# Patient Record
Sex: MALE | Race: WHITE | NOT HISPANIC OR LATINO | Employment: OTHER | URBAN - METROPOLITAN AREA
[De-identification: names, ages, dates, MRNs, and addresses within clinical notes are randomized per-mention and may not be internally consistent; named-entity substitution may affect disease eponyms.]

---

## 2018-02-27 ENCOUNTER — OFFICE VISIT CONVERTED (OUTPATIENT)
Dept: CARDIOLOGY | Facility: CLINIC | Age: 73
End: 2018-02-27
Attending: SPECIALIST

## 2018-04-12 ENCOUNTER — OFFICE VISIT CONVERTED (OUTPATIENT)
Dept: ORTHOPEDIC SURGERY | Facility: CLINIC | Age: 73
End: 2018-04-12
Attending: PHYSICIAN ASSISTANT

## 2018-05-03 ENCOUNTER — OFFICE VISIT CONVERTED (OUTPATIENT)
Dept: ORTHOPEDIC SURGERY | Facility: CLINIC | Age: 73
End: 2018-05-03
Attending: ORTHOPAEDIC SURGERY

## 2018-05-14 ENCOUNTER — CONVERSION ENCOUNTER (OUTPATIENT)
Dept: ORTHOPEDIC SURGERY | Facility: CLINIC | Age: 73
End: 2018-05-14

## 2018-05-14 ENCOUNTER — OFFICE VISIT CONVERTED (OUTPATIENT)
Dept: ORTHOPEDIC SURGERY | Facility: CLINIC | Age: 73
End: 2018-05-14
Attending: PHYSICIAN ASSISTANT

## 2018-06-05 ENCOUNTER — OFFICE VISIT CONVERTED (OUTPATIENT)
Dept: ORTHOPEDIC SURGERY | Facility: CLINIC | Age: 73
End: 2018-06-05
Attending: PHYSICIAN ASSISTANT

## 2018-07-10 ENCOUNTER — OFFICE VISIT CONVERTED (OUTPATIENT)
Dept: ORTHOPEDIC SURGERY | Facility: CLINIC | Age: 73
End: 2018-07-10
Attending: PHYSICIAN ASSISTANT

## 2018-08-28 ENCOUNTER — OFFICE VISIT CONVERTED (OUTPATIENT)
Dept: CARDIOLOGY | Facility: CLINIC | Age: 73
End: 2018-08-28
Attending: SPECIALIST

## 2019-06-24 ENCOUNTER — OFFICE VISIT CONVERTED (OUTPATIENT)
Dept: CARDIOLOGY | Facility: CLINIC | Age: 74
End: 2019-06-24
Attending: SPECIALIST

## 2019-08-20 ENCOUNTER — CONVERSION ENCOUNTER (OUTPATIENT)
Dept: CARDIOLOGY | Facility: CLINIC | Age: 74
End: 2019-08-20
Attending: SPECIALIST

## 2019-12-17 ENCOUNTER — OFFICE VISIT CONVERTED (OUTPATIENT)
Dept: CARDIOLOGY | Facility: CLINIC | Age: 74
End: 2019-12-17
Attending: SPECIALIST

## 2020-06-19 ENCOUNTER — OFFICE VISIT CONVERTED (OUTPATIENT)
Dept: CARDIOLOGY | Facility: CLINIC | Age: 75
End: 2020-06-19
Attending: SPECIALIST

## 2021-01-05 ENCOUNTER — OFFICE VISIT CONVERTED (OUTPATIENT)
Dept: CARDIOLOGY | Facility: CLINIC | Age: 76
End: 2021-01-05
Attending: SPECIALIST

## 2021-01-05 ENCOUNTER — CONVERSION ENCOUNTER (OUTPATIENT)
Dept: OTHER | Facility: HOSPITAL | Age: 76
End: 2021-01-05

## 2021-05-13 NOTE — PROGRESS NOTES
"   Progress Note      Patient Name: Michael Garcia   Patient ID: [id number]   Sex: Male   YOB: 1945    Primary Care Provider: Casey Adams MD   Referring Provider: Mr. Terrence Dyer PA-C    Visit Date: June 19, 2020    Provider: Casey Adams MD   Location: [facility]   Location Address: [address] [place] [address], [facility]   [place], [place]  [id number]   Location Phone: [phone]          Chief Complaint     Coronary artery disease.  Hyperlipidemia.       History Of Present Illness  Michael Garcia is a 74 year old /White male with a history of coronary artery disease status post coronary artery bypass graft surgery. Denies any chest pain or shortness of breath.   CURRENT MEDICATIONS: Aspirin 81 mg daily; Crestor 20 mg daily. Dosage and frequency of the medication(s) reviewed with the patient.   PAST MEDICAL HISTORY: Atrial fibrillation status post ablation; Coronary artery disease status post CABG; Hyperlipidemia.   FAMILY HISTORY: Negative for diabetes mellitus, hypertension, or heart disease.   PSYCHOSOCIAL HISTORY: Denies alcohol or tobacco use. Walks 2-4 miles daily and 30 minute workout daily. Denies mood changes or depression.       Review of Systems  · Cardiovascular  o Denies  o : palpitations (fast, fluttering, or skipping beats), swelling (feet, ankles, hands), shortness of breath while walking or lying flat, chest pain or angina pectoris   · Respiratory  o Denies  o : chronic or frequent cough, asthma or wheezing      Vitals  Date Time BP Position Site L\R Cuff Size HR RR TEMP (F) WT  HT  BMI kg/m2 BSA m2 O2 Sat        06/19/2020 11:21 /60 Sitting    62 - R   180lbs 0oz 5'  6\" 29.05 1.95           Physical Examination  · Constitutional  o Appearance  o : Awake, alert, cooperative, pleasant.  · Respiratory  o Inspection of Chest  o : No chest wall deformities, moving equal.  o Auscultation of Lungs  o : Good air entry with vesicular breath " sounds.  · Cardiovascular  o Heart  o :   § Auscultation of Heart  § : S1 and S2 regular. No S3. No S4. No murmurs.  o Peripheral Vascular System  o :   § Extremities  § : Peripheral pulses were well felt. No edema. No cyanosis.  · Gastrointestinal  o Abdominal Examination  o : No masses or organomegaly noted.  · EKG  o EKG  o : Performed in the office today.  o Indications  o : Atrial fibrillation.   o Results  o : Sinus rhythm. Inferior wall MI, age-indeterminate.           Assessment     ASSESSMENT & PLAN:    1.  Coronary artery disease with history of coronary artery bypass graft surgery, stable.  Continue aspirin.  2.  Atrial fibrillation status post ablation, stable.  3.  Hyperlipidemia.  Continue Crestor.    4.  See me back in 6 months.             Electronically Signed by: Dimple Rizzo-, Other -Author on June 22, 2020 10:04:05 AM  Electronically Co-signed by: Casey Adams MD -Reviewer on July 13, 2020 08:59:23 AM

## 2021-05-14 VITALS
WEIGHT: 183 LBS | BODY MASS INDEX: 29.41 KG/M2 | DIASTOLIC BLOOD PRESSURE: 70 MMHG | SYSTOLIC BLOOD PRESSURE: 122 MMHG | HEIGHT: 66 IN | HEART RATE: 72 BPM

## 2021-05-14 NOTE — PROGRESS NOTES
"   Progress Note      Patient Name: Michael Garcia   Patient ID: [id number]   Sex: Male   YOB: 1945    Primary Care Provider: Casey Adams MD   Referring Provider: Mr. Terrence Dyer PA-C    Visit Date: January 5, 2021    Provider: Casey Adams MD   Location: [facility]   Location Address: [address] [place] [address], [address]   [place], [place]  [id number]   Location Phone: [phone]          Chief Complaint     Coronary artery disease.   Hyperlipidemia.  History of atrial fibrillation.       History Of Present Illness  Michael Garcia is a 75 year old /White male with history of coronary artery disease status post coronary artery bypass graft surgery and history of atrial fibrillation status post ablation. No chest pain or shortness of breath.   CURRENT MEDICATIONS: Crestor 20 mg daily; aspirin 81 mg daily.   PAST MEDICAL HISTORY: Atrial fibrillation status post ablation; Coronary artery disease status post CABG; Hyperlipidemia.   PSYCHOSOCIAL HISTORY: Denies use of alcohol. Denies tobacco use.      ALLERGIES:  No known drug allergies.       Review of Systems  · Cardiovascular  o Denies  o : palpitations (fast, fluttering, or skipping beats), swelling (feet, ankles, hands), shortness of breath while walking or lying flat, chest pain or angina pectoris   · Respiratory  o Denies  o : chronic or frequent cough      Vitals  Date Time BP Position Site L\R Cuff Size HR RR TEMP (F) WT  HT  BMI kg/m2 BSA m2 O2 Sat FR L/min FiO2 HC       01/05/2021 02:01 /70 Sitting    72 - R   183lbs 0oz 5'  6\" 29.54 1.97             Physical Examination  · Constitutional  o Appearance  o : Awake, alert, cooperative, pleasant.  · Respiratory  o Inspection of Chest  o : No chest wall deformities, moving equal.  o Auscultation of Lungs  o : Good air entry with vesicular breath sounds.  · Cardiovascular  o Heart  o :   § Auscultation of Heart  § : S1 and S2 regular. No S3. No S4. No murmurs.  o Peripheral " Vascular System  o :   § Extremities  § : Peripheral pulses were well felt. No edema. No cyanosis.  · Gastrointestinal  o Abdominal Examination  o : No masses or organomegaly noted.          Assessment     ASSESSMENT AND PLAN:  1.  Coronary artery disease with history of coronary artery bypass graft surgery, stable.  Continue aspirin.   2.  Atrial fibrillation, status post ablation, stable.  He has been in sinus rhythm for several years.   3.  Hyperlipidemia.  Continue Crestor.  Check lipids and hepatic profile on next visit.   4.  See me back in 6 months.                Electronically Signed by: Alyssa Kraus-, Other -Author on January 5, 2021 04:31:52 PM  Electronically Co-signed by: Casey Adams MD -Reviewer on January 11, 2021 12:21:37 PM

## 2021-05-15 VITALS
SYSTOLIC BLOOD PRESSURE: 144 MMHG | HEART RATE: 58 BPM | BODY MASS INDEX: 29.25 KG/M2 | HEIGHT: 66 IN | DIASTOLIC BLOOD PRESSURE: 86 MMHG | WEIGHT: 182 LBS

## 2021-05-15 VITALS
HEART RATE: 62 BPM | SYSTOLIC BLOOD PRESSURE: 126 MMHG | HEIGHT: 66 IN | DIASTOLIC BLOOD PRESSURE: 60 MMHG | WEIGHT: 180 LBS | BODY MASS INDEX: 28.93 KG/M2

## 2021-05-15 VITALS
HEART RATE: 62 BPM | WEIGHT: 175 LBS | HEIGHT: 66 IN | DIASTOLIC BLOOD PRESSURE: 72 MMHG | BODY MASS INDEX: 28.12 KG/M2 | SYSTOLIC BLOOD PRESSURE: 134 MMHG

## 2021-05-16 VITALS
DIASTOLIC BLOOD PRESSURE: 80 MMHG | WEIGHT: 176 LBS | HEIGHT: 66 IN | BODY MASS INDEX: 28.28 KG/M2 | HEART RATE: 52 BPM | SYSTOLIC BLOOD PRESSURE: 136 MMHG

## 2021-05-16 VITALS — HEIGHT: 66 IN | RESPIRATION RATE: 18 BRPM | WEIGHT: 185 LBS | BODY MASS INDEX: 29.73 KG/M2

## 2021-05-16 VITALS — WEIGHT: 185 LBS | RESPIRATION RATE: 16 BRPM | BODY MASS INDEX: 29.73 KG/M2 | HEIGHT: 66 IN

## 2021-05-16 VITALS — RESPIRATION RATE: 16 BRPM | HEIGHT: 66 IN | WEIGHT: 185 LBS | BODY MASS INDEX: 29.73 KG/M2

## 2021-05-16 VITALS — WEIGHT: 185 LBS | BODY MASS INDEX: 29.73 KG/M2 | RESPIRATION RATE: 16 BRPM | HEIGHT: 66 IN

## 2021-05-16 VITALS
SYSTOLIC BLOOD PRESSURE: 124 MMHG | HEIGHT: 66 IN | DIASTOLIC BLOOD PRESSURE: 74 MMHG | HEART RATE: 70 BPM | WEIGHT: 185 LBS | BODY MASS INDEX: 29.73 KG/M2

## 2021-05-16 VITALS — BODY MASS INDEX: 29.73 KG/M2 | HEIGHT: 66 IN | RESPIRATION RATE: 16 BRPM | WEIGHT: 185 LBS

## 2021-07-01 RX ORDER — ROSUVASTATIN CALCIUM 20 MG/1
20 TABLET, COATED ORAL DAILY
COMMUNITY
End: 2022-08-30 | Stop reason: SDUPTHER

## 2021-07-01 RX ORDER — HYDROCODONE BITARTRATE AND ACETAMINOPHEN 7.5; 325 MG/1; MG/1
1-2 TABLET ORAL EVERY 6 HOURS PRN
COMMUNITY
End: 2022-02-15

## 2021-07-01 RX ORDER — DICLOFENAC SODIUM AND MISOPROSTOL 75; 200 MG/1; UG/1
1 TABLET, DELAYED RELEASE ORAL 2 TIMES DAILY
COMMUNITY
End: 2022-02-15

## 2021-07-01 RX ORDER — ASPIRIN 81 MG/1
81 TABLET ORAL DAILY
COMMUNITY

## 2021-07-08 ENCOUNTER — OFFICE VISIT (OUTPATIENT)
Dept: CARDIOLOGY | Facility: CLINIC | Age: 76
End: 2021-07-08

## 2021-07-08 VITALS
DIASTOLIC BLOOD PRESSURE: 80 MMHG | HEART RATE: 64 BPM | BODY MASS INDEX: 29.73 KG/M2 | SYSTOLIC BLOOD PRESSURE: 152 MMHG | HEIGHT: 66 IN | WEIGHT: 185 LBS

## 2021-07-08 DIAGNOSIS — Z95.1 HX OF CABG: ICD-10-CM

## 2021-07-08 DIAGNOSIS — R00.2 PALPITATIONS: Primary | ICD-10-CM

## 2021-07-08 DIAGNOSIS — I25.10 CORONARY ARTERY DISEASE INVOLVING NATIVE CORONARY ARTERY OF NATIVE HEART WITHOUT ANGINA PECTORIS: ICD-10-CM

## 2021-07-08 PROCEDURE — 93000 ELECTROCARDIOGRAM COMPLETE: CPT | Performed by: SPECIALIST

## 2021-07-08 PROCEDURE — 99213 OFFICE O/P EST LOW 20 MIN: CPT | Performed by: SPECIALIST

## 2021-07-08 NOTE — PROGRESS NOTES
[facility]  Cardiology progress Note    Patient Name: Michael Garcia  : 1945    CC: Coronary artery disease, palpitations    Subjective   Subjective       HPI:  Michael Garcia is a 75 y.o. male with history of palpitations and coronary disease.  No further palpitations no chest pain or shortness of breath.    Review of Systems:   Constitutional no fever,  no weight loss   Skin no rash   Otolaryngeal no difficulty swallowing   Cardiovascular See HPI   Pulmonary no cough, no sputum production   Gastrointestinal no constipation, no diarrhea   Genitourinary no dysuria, no hematuria   Hematologic no easy bruisability, no abnormal bleeding   Musculoskeletal no muscle pain   Neurologic no dizziness, no falls         Personal History     Social History:  reports that he quit smoking about 51 years ago. He has never used smokeless tobacco. He reports previous alcohol use. He reports that he does not use drugs.    Home Medications:  Current Outpatient Medications on File Prior to Visit   Medication Sig   • aspirin (aspirin) 81 MG EC tablet Take 81 mg by mouth Daily.   • rosuvastatin (CRESTOR) 20 MG tablet Take 20 mg by mouth Daily.   • diclofenac-miSOPROStol (ARTHROTEC 75) 75-0.2 MG EC tablet Take 1 tablet by mouth 2 (Two) Times a Day.   • HYDROcodone-acetaminophen (NORCO) 7.5-325 MG per tablet Take 1-2 tablets by mouth Every 6 (Six) Hours As Needed.     No current facility-administered medications on file prior to visit.     Allergies:  No Known Allergies    Objective    Objective       Vitals:   Heart Rate:  [64] 64  BP: (152)/(80) 152/80  Body mass index is 29.86 kg/m².     Physical Exam:   Constitutional: Awake, alert, No acute distress    Eyes: PERRLA, sclerae anicteric, no conjunctival injection   HENT: NCAT, mucous membranes moist   Neck: Supple, no thyromegaly, no lymphadenopathy, trachea midline   Respiratory: Clear to auscultation bilaterally, nonlabored respirations    Cardiovascular: RRR,  no murmurs, rubs, or gallops, palpable pedal pulses bilaterally   Gastrointestinal: Positive bowel sounds, soft, nontender, nondistended   Musculoskeletal: No bilateral ankle edema, no clubbing or cyanosis to extremities   Psychiatric: Appropriate affect, cooperative   Neurologic: Oriented x 3, strength symmetric in all extremities, Cranial Nerves grossly intact to confrontation, speech clear   Skin: No rashes.    Result Review    Result Review:  I have personally reviewed the available results from  [x]  Laboratory  [x]  EKG  [x]  Cardiology  [x]  Medications  [x]  Old records  []  Other:     ECG 12 Lead    Date/Time: 7/8/2021 11:46 AM  Performed by: Casey Adams MD  Authorized by: Casey Adams MD   Comparison: compared with previous ECG   Similar to previous ECG  Rhythm: sinus rhythm    Clinical impression: normal ECG  Comments: Normal sinus rhythm.  No significant changes compared to previous EKG            Impression/Plan:  1 coronary disease with history of coronary artery bypass graft stable: Continue aspirin.  2. Palpitations stable: No further palpitations.  EKG shows sinus rhythm  3.  Hyperlipidemia: Continue Crestor.  Check lipid profile next visit.             Electronically signed by Casey Adams MD, 07/08/21, 11:27 AM EDT.

## 2022-02-13 NOTE — PROGRESS NOTES
[facility]  Cardiology progress Note    Patient Name: Michael Garcia  : 1945    CHIEF COMPLAINT  CORONARY ARTERY DISEASE, history of CABG.      Subjective   Subjective     HISTORY OF PRESENT ILLNESS    Michael Garcia is a 76 y.o. male with history of coronary disease and CABG.  No chest pain or shortness of breath.    Review of Systems:   Constitutional no fever,  no weight loss   Skin no rash   Otolaryngeal no difficulty swallowing   Cardiovascular See HPI   Pulmonary no cough, no sputum production   Gastrointestinal no constipation, no diarrhea   Genitourinary no dysuria, no hematuria   Hematologic no easy bruisability, no abnormal bleeding   Musculoskeletal no muscle pain   Neurologic no dizziness, no falls         Personal History     Social History:  reports that he quit smoking about 52 years ago. He has never used smokeless tobacco. He reports previous alcohol use. He reports that he does not use drugs.    Home Medications:  Current Outpatient Medications on File Prior to Visit   Medication Sig   • aspirin (aspirin) 81 MG EC tablet Take 81 mg by mouth Daily.   • rosuvastatin (CRESTOR) 20 MG tablet Take 20 mg by mouth Daily.   • [DISCONTINUED] diclofenac-miSOPROStol (ARTHROTEC 75) 75-0.2 MG EC tablet Take 1 tablet by mouth 2 (Two) Times a Day.   • [DISCONTINUED] HYDROcodone-acetaminophen (NORCO) 7.5-325 MG per tablet Take 1-2 tablets by mouth Every 6 (Six) Hours As Needed.     No current facility-administered medications on file prior to visit.     Allergies:  No Known Allergies    Objective    Objective       Vitals:   Heart Rate:  [65] 65  BP: (131)/(69) 131/69  Body mass index is 29.7 kg/m².     Physical Exam:   Constitutional: Awake, alert, No acute distress    Eyes: PERRLA, sclerae anicteric, no conjunctival injection   HENT: NCAT, mucous membranes moist   Neck: Supple, no thyromegaly, no lymphadenopathy, trachea midline   Respiratory: Clear to auscultation bilaterally, nonlabored  respirations    Cardiovascular: RRR, no murmurs or rubs. Palpable pedal pulses bilaterally   Musculoskeletal: No bilateral ankle edema, no cyanosis to extremities   Psychiatric: Appropriate affect, cooperative   Neurologic: Oriented x 3, strength symmetric in all extremities, Cranial Nerves grossly intact to confrontation, speech clear   Skin: No rashes.    Result Review    Result Review:  I have personally reviewed the available results from  [x]  Laboratory  [x]  EKG  [x]  Cardiology  [x]  Medications  [x]  Old records  []  Other:     ECG 12 Lead    Date/Time: 2/15/2022 11:46 AM  Performed by: Casey Adams MD  Authorized by: Casey Adams MD   Comparison: compared with previous ECG   Similar to previous ECG  Rhythm: sinus rhythm  Rate: normal  Conduction: non-specific intraventricular conduction delay  QRS axis: normal    Clinical impression: abnormal EKG  Comments: Normal sinus rhythm.  Inferior wall MI age-indeterminate.  Nonspecific ST changes.  No change from previous EKG             Impression/Plan:  1.  Coronary disease s/p CABG: Continue aspirin 81 mg a day.  No further chest pain.  2.  History of atrial fibrillation status post ablation stable: Sinus rhythm for several years.  No palpitations.  3.  Hyperlipidemia: Continue Crestor 20 mg a day.  Check lipid and hepatic profile.           Casey Adams MD   02/15/22   11:41 EST

## 2022-02-15 ENCOUNTER — OFFICE VISIT (OUTPATIENT)
Dept: CARDIOLOGY | Facility: CLINIC | Age: 77
End: 2022-02-15

## 2022-02-15 VITALS
BODY MASS INDEX: 29.57 KG/M2 | HEART RATE: 65 BPM | SYSTOLIC BLOOD PRESSURE: 131 MMHG | WEIGHT: 184 LBS | HEIGHT: 66 IN | DIASTOLIC BLOOD PRESSURE: 69 MMHG

## 2022-02-15 DIAGNOSIS — E78.2 HYPERLIPEMIA, MIXED: ICD-10-CM

## 2022-02-15 DIAGNOSIS — Z95.5 HISTORY OF CORONARY ANGIOPLASTY WITH INSERTION OF STENT: ICD-10-CM

## 2022-02-15 DIAGNOSIS — I25.10 CORONARY ARTERY DISEASE INVOLVING NATIVE CORONARY ARTERY OF NATIVE HEART WITHOUT ANGINA PECTORIS: Primary | ICD-10-CM

## 2022-02-15 PROCEDURE — 93000 ELECTROCARDIOGRAM COMPLETE: CPT | Performed by: SPECIALIST

## 2022-02-15 PROCEDURE — 99214 OFFICE O/P EST MOD 30 MIN: CPT | Performed by: SPECIALIST

## 2022-03-01 ENCOUNTER — LAB (OUTPATIENT)
Dept: LAB | Facility: HOSPITAL | Age: 77
End: 2022-03-01

## 2022-03-01 DIAGNOSIS — E78.2 HYPERLIPEMIA, MIXED: ICD-10-CM

## 2022-03-01 LAB
ALBUMIN SERPL-MCNC: 4.2 G/DL (ref 3.5–5.2)
ALP SERPL-CCNC: 36 U/L (ref 39–117)
ALT SERPL W P-5'-P-CCNC: 16 U/L (ref 1–41)
AST SERPL-CCNC: 17 U/L (ref 1–40)
BILIRUB CONJ SERPL-MCNC: 0.2 MG/DL (ref 0–0.3)
BILIRUB INDIRECT SERPL-MCNC: 0.4 MG/DL
BILIRUB SERPL-MCNC: 0.6 MG/DL (ref 0–1.2)
CHOLEST SERPL-MCNC: 143 MG/DL (ref 0–200)
HDLC SERPL-MCNC: 44 MG/DL (ref 40–60)
LDLC SERPL CALC-MCNC: 85 MG/DL (ref 0–100)
LDLC/HDLC SERPL: 1.95 {RATIO}
PROT SERPL-MCNC: 7.6 G/DL (ref 6–8.5)
TRIGL SERPL-MCNC: 67 MG/DL (ref 0–150)
VLDLC SERPL-MCNC: 14 MG/DL (ref 5–40)

## 2022-03-01 PROCEDURE — 80061 LIPID PANEL: CPT

## 2022-03-01 PROCEDURE — 80076 HEPATIC FUNCTION PANEL: CPT

## 2022-03-01 PROCEDURE — 36415 COLL VENOUS BLD VENIPUNCTURE: CPT

## 2022-03-03 ENCOUNTER — OFFICE VISIT (OUTPATIENT)
Dept: CARDIOLOGY | Facility: CLINIC | Age: 77
End: 2022-03-03

## 2022-03-03 VITALS
BODY MASS INDEX: 29.09 KG/M2 | DIASTOLIC BLOOD PRESSURE: 78 MMHG | WEIGHT: 181 LBS | HEART RATE: 59 BPM | HEIGHT: 66 IN | SYSTOLIC BLOOD PRESSURE: 134 MMHG

## 2022-03-03 DIAGNOSIS — R07.89 ATYPICAL CHEST PAIN: Primary | ICD-10-CM

## 2022-03-03 PROBLEM — S83.212A BUCKET-HANDLE TEAR OF MEDIAL MENISCUS OF LEFT KNEE AS CURRENT INJURY: Status: ACTIVE | Noted: 2018-05-03

## 2022-03-03 PROCEDURE — 99212 OFFICE O/P EST SF 10 MIN: CPT | Performed by: NURSE PRACTITIONER

## 2022-03-03 NOTE — PROGRESS NOTES
"Chief Complaint  Follow-up and Numbness (back of right arm )    Subjective     {Problem List  Visit Diagnosis   Encounters  Notes  Medications  Labs  Result Review Imaging  Media :23}       History of Present Illness  Michael Garcia is a 76-year-old white/ male patient who presents to the office today with complaints of right shoulder pain that began approximately 2 weeks ago.  He reports that the pain is radiating toward his back, right forearm, and has associated numbness sensation that began 2 days after the initial pain began.  He also has complaint of forgetfulness that has been worsening over the last few months.  His wife is present in the office with him today and confirms this.  The wife states that \"he will not go to a family doctor, this is the only doctor I can get him to come to\".  He denies any chest pain, shortness of breath, lightheadedness/dizziness, palpitations, or edema.  He reports compliance with taking his aspirin and Crestor daily.    PMH  Past Medical History:   Diagnosis Date   • Atrial fibrillation    • Bucket-handle tear of medial meniscus of left knee as current injury 05/15/2018    Subsequent encounter   • Coronary artery disease    • Hyperlipidemia    • Osteoarthritis of AC (acromioclavicular) joint 06/11/2015   • Palpitations    • Peripheral tear of medial meniscus of left knee as current injury 05/03/2018    Subsequent encouter   • Rotator cuff tear 03/19/2015   • SLAP tear of shoulder 03/19/2015         ALLERGY  No Known Allergies       SURGICALHX  Past Surgical History:   Procedure Laterality Date   • CARDIAC ABLATION     • CARDIAC CATHETERIZATION     • CARDIAC SURGERY      Heart Bypass   • CORONARY ARTERY BYPASS GRAFT     • KNEE ARTHROSCOPY W/ PARTIAL MEDIAL MENISCECTOMY      Meniscectomy, knee, medial   • SHOULDER SURGERY      Arthroscopic Shoulder Surgery          SOC  Social History     Socioeconomic History   • Marital status:    Tobacco Use   • " "Smoking status: Former Smoker     Quit date: 1970     Years since quittin.2   • Smokeless tobacco: Never Used   Vaping Use   • Vaping Use: Never used   Substance and Sexual Activity   • Alcohol use: Not Currently   • Drug use: Never   • Sexual activity: Defer         FAMHX  Family History   Problem Relation Age of Onset   • Diabetes Mother    • No Known Problems Father           MEDSIGONLY  Current Outpatient Medications on File Prior to Visit   Medication Sig   • aspirin (aspirin) 81 MG EC tablet Take 81 mg by mouth Daily.   • rosuvastatin (CRESTOR) 20 MG tablet Take 20 mg by mouth Daily.     No current facility-administered medications on file prior to visit.         Objective   /78   Pulse 59   Ht 167.6 cm (66\")   Wt 82.1 kg (181 lb)   BMI 29.21 kg/m²       Physical Exam  HENT:      Head: Normocephalic.   Neck:      Vascular: No carotid bruit.   Cardiovascular:      Rate and Rhythm: Normal rate and regular rhythm.      Pulses: Normal pulses.      Heart sounds: Normal heart sounds. No murmur heard.      Pulmonary:      Effort: Pulmonary effort is normal.      Breath sounds: Normal breath sounds.   Musculoskeletal:      Cervical back: Neck supple.      Right lower leg: No edema.      Left lower leg: No edema.   Skin:     General: Skin is dry.   Neurological:      Mental Status: He is alert and oriented to person, place, and time.   Psychiatric:         Behavior: Behavior normal.     Patient refused to have EKG performed      Result Review :   The following data was reviewed by: DARA Alfonso on 2022:  No results found for: PROBNP  CMP    CMP 3/1/22   Albumin 4.20   Total Bilirubin 0.6   Alkaline Phosphatase 36 (A)   AST (SGOT) 17   ALT (SGPT) 16   (A) Abnormal value               No results found for: TSH   No results found for: FREET4   No results found for: DDIMERQUANT  No results found for: MG   No results found for: DIGOXIN   No results found for: TROPONINT        Lipid Panel  "   Lipid Panel 3/1/22   Total Cholesterol 143   Triglycerides 67   HDL Cholesterol 44   VLDL Cholesterol 14   LDL Cholesterol  85   LDL/HDL Ratio 1.95                Assessment and Plan    Diagnoses and all orders for this visit:    1. Atypical chest pain (Primary)  Offered to order EKG to be done here in the office to assess for possible ischemia but patient declines at this time.  He and his wife believe that the shoulder pain on the right side is most likely secondary to previous rotator cuff injury.  Advised that he seek out orthopedic referral for his shoulder pain.  His wife reports that his memory has continued to worsen over the last few months.  I offered to refer to neurology but patient declines at this time.  Patient refuses to go to a family doctor.  Patient was advised to go to the emergency room if he has new, worsening, or persistence in his symptoms.  He verbalizes understanding and agrees at this time.      Follow Up   Return for Keep August follow up with Dr Adams.    Patient was given instructions and counseling regarding his condition or for health maintenance advice. Please see specific information pulled into the AVS if appropriate.     Michael Garcia  reports that he quit smoking about 52 years ago. He has never used smokeless tobacco.           Emilee Mesa, DARA  03/03/22  08:17 EST    Dictated Utilizing Dragon Dictation

## 2022-08-28 NOTE — PROGRESS NOTES
[facility]  Cardiology progress Note    Patient Name: Michael Garcia  : 1945    CHIEF COMPLAINT  CORONARY ARTERY DISEASE        Subjective   Subjective     HISTORY OF PRESENT ILLNESS    Michael Garcia is a 77 y.o. male with history of coronary disease s/p CABG.  No chest pain or shortness of breath.    REVIEW OF SYSTEMS    Constitutional:    No fever, no weight loss  Skin:     No rash  Otolaryngeal:    No difficulty swallowing  Cardiovascular:  No chest pain or shortness of breath  Pulmonary:    No cough, no sputum production    Personal History     Social History:    reports that he quit smoking about 52 years ago. He has never used smokeless tobacco. He reports previous alcohol use. He reports that he does not use drugs.    Home Medications:  Current Outpatient Medications on File Prior to Visit   Medication Sig   • aspirin 81 MG EC tablet Take 81 mg by mouth Daily.   • [DISCONTINUED] rosuvastatin (CRESTOR) 20 MG tablet Take 20 mg by mouth Daily.     No current facility-administered medications on file prior to visit.       Past Medical History:   Diagnosis Date   • Atrial fibrillation    • Bucket-handle tear of medial meniscus of left knee as current injury 05/15/2018    Subsequent encounter   • Coronary artery disease    • Hyperlipidemia    • Osteoarthritis of AC (acromioclavicular) joint 2015   • Palpitations    • Peripheral tear of medial meniscus of left knee as current injury 2018    Subsequent encouter   • Rotator cuff tear 2015   • SLAP tear of shoulder 2015       Allergies:  No Known Allergies    Objective    Objective       Vitals:   Heart Rate:  [59] 59  BP: (152)/(85) 152/85  Body mass index is 29.54 kg/m².     PHYSICAL EXAM:    General Appearance:   · well developed  · well nourished  HENT:   · oropharynx moist  · lips not cyanotic  Neck:  · thyroid not enlarged  · supple  Respiratory:  · no respiratory distress  · normal breath sounds  · no  rales  Cardiovascular:  · no jugular venous distention  · regular rhythm  · apical impulse normal  · S1 normal, S2 normal  · no S3, no S4   · no murmur  · no rub, no thrill  · carotid pulses normal; no bruit  · pedal pulses normal  · lower extremity edema: none    Skin:   · warm, dry  Psychiatric:  · judgement and insight appropriate  · normal mood and affect        Result Review:  I have personally reviewed the available results from  [x]  Laboratory  [x]  EKG  [x]  Cardiology  [x]  Medications  [x]  Old records  []  Other:     Procedures  Lab Results   Component Value Date    CHOL 143 03/01/2022     Lab Results   Component Value Date    TRIG 67 03/01/2022     Lab Results   Component Value Date    HDL 44 03/01/2022     Lab Results   Component Value Date    LDL 85 03/01/2022     Lab Results   Component Value Date    VLDL 14 03/01/2022        Impression/Plan:  1.  Coronary artery s/p CABG stable: Continue aspirin 81 mg a day.  No chest pain.  2.  Hyperlipidemia: Continue Crestor 20 mg a day.  Monitor lipid and hepatic profile.  3.  History of Atrail fibrillation s/p ablation stable: Sinus rhythm for several years.  No palpitations.           Casey Adams MD   08/30/22   10:15 EDT

## 2022-08-30 ENCOUNTER — OFFICE VISIT (OUTPATIENT)
Dept: CARDIOLOGY | Facility: CLINIC | Age: 77
End: 2022-08-30

## 2022-08-30 VITALS
BODY MASS INDEX: 29.41 KG/M2 | DIASTOLIC BLOOD PRESSURE: 85 MMHG | SYSTOLIC BLOOD PRESSURE: 152 MMHG | WEIGHT: 183 LBS | HEIGHT: 66 IN | HEART RATE: 59 BPM

## 2022-08-30 DIAGNOSIS — I25.10 CORONARY ARTERY DISEASE INVOLVING NATIVE CORONARY ARTERY OF NATIVE HEART WITHOUT ANGINA PECTORIS: Primary | ICD-10-CM

## 2022-08-30 DIAGNOSIS — E78.2 HYPERLIPEMIA, MIXED: ICD-10-CM

## 2022-08-30 DIAGNOSIS — Z95.1 HX OF CABG: ICD-10-CM

## 2022-08-30 PROCEDURE — 99214 OFFICE O/P EST MOD 30 MIN: CPT | Performed by: SPECIALIST

## 2022-08-30 RX ORDER — ROSUVASTATIN CALCIUM 20 MG/1
20 TABLET, COATED ORAL DAILY
Qty: 90 TABLET | Refills: 6 | Status: SHIPPED | OUTPATIENT
Start: 2022-08-30 | End: 2023-03-01 | Stop reason: SDUPTHER

## 2023-02-28 DIAGNOSIS — I25.10 CORONARY ARTERY DISEASE INVOLVING NATIVE CORONARY ARTERY OF NATIVE HEART WITHOUT ANGINA PECTORIS: ICD-10-CM

## 2023-03-01 ENCOUNTER — OFFICE VISIT (OUTPATIENT)
Dept: CARDIOLOGY | Facility: CLINIC | Age: 78
End: 2023-03-01
Payer: MEDICARE

## 2023-03-01 VITALS
BODY MASS INDEX: 30.22 KG/M2 | HEART RATE: 64 BPM | HEIGHT: 66 IN | DIASTOLIC BLOOD PRESSURE: 62 MMHG | SYSTOLIC BLOOD PRESSURE: 126 MMHG | WEIGHT: 188 LBS

## 2023-03-01 DIAGNOSIS — E78.2 HYPERLIPEMIA, MIXED: ICD-10-CM

## 2023-03-01 DIAGNOSIS — I25.10 CORONARY ARTERY DISEASE INVOLVING NATIVE CORONARY ARTERY OF NATIVE HEART WITHOUT ANGINA PECTORIS: Primary | ICD-10-CM

## 2023-03-01 DIAGNOSIS — Z95.1 HX OF CABG: ICD-10-CM

## 2023-03-01 PROCEDURE — 99213 OFFICE O/P EST LOW 20 MIN: CPT | Performed by: SPECIALIST

## 2023-03-01 RX ORDER — ROSUVASTATIN CALCIUM 20 MG/1
20 TABLET, COATED ORAL DAILY
Qty: 90 TABLET | Refills: 6 | Status: SHIPPED | OUTPATIENT
Start: 2023-03-01 | End: 2023-03-16 | Stop reason: SDUPTHER

## 2023-03-01 RX ORDER — ROSUVASTATIN CALCIUM 20 MG/1
20 TABLET, COATED ORAL DAILY
Qty: 90 TABLET | Refills: 6 | Status: SHIPPED | OUTPATIENT
Start: 2023-03-01 | End: 2023-03-01 | Stop reason: SDUPTHER

## 2023-03-16 RX ORDER — ROSUVASTATIN CALCIUM 20 MG/1
20 TABLET, COATED ORAL DAILY
Qty: 90 TABLET | Refills: 3 | Status: SHIPPED | OUTPATIENT
Start: 2023-03-16 | End: 2023-04-06

## 2023-04-05 ENCOUNTER — TELEPHONE (OUTPATIENT)
Dept: CARDIOLOGY | Facility: CLINIC | Age: 78
End: 2023-04-05
Payer: MEDICARE

## 2023-04-05 NOTE — TELEPHONE ENCOUNTER
PT CALLED AND SAID HE COULD NOT TAKE ROSUVASTATIN AND WANTS BRAND NAME CRESTOR 20MG CALLED IN TO [facility].  CALLED [facility] FOR [facility] RX AND WAS TOLD NAME BRAND NEEDS PA.  [phone].  PA STARTED

## 2023-04-06 RX ORDER — ATORVASTATIN CALCIUM 40 MG/1
40 TABLET, FILM COATED ORAL DAILY
Qty: 90 TABLET | Refills: 3 | Status: SHIPPED | OUTPATIENT
Start: 2023-04-06

## 2023-04-06 NOTE — TELEPHONE ENCOUNTER
NAME BRAND CRESTOR DENIED BY INS.  INS STATES HAVE TO TAKE ATORVASTATIN OR PAY OUT OF POCKET FOR MED.  CALLED PT AND HE AGREED TO CHANGE TO ATORVASTATIN.  HE SAID THE ROSUVASTATIN HE HAS ISN'T WORKING.  PLEASE ADVISE

## 2023-04-12 NOTE — TELEPHONE ENCOUNTER
Spoke with pt wife.  Let her know rx for atorvastatin was sent to [facility].  If pt wants rx to go to [facility] he will let us know.

## 2023-10-02 NOTE — PROGRESS NOTES
[facility]  Cardiology progress Note    Patient Name: Michael Garcia  : 1945    CHIEF COMPLAINT  CAD        Subjective   Subjective     HISTORY OF PRESENT ILLNESS    Michael Garcia is a 78 y.o. male with CAD s/p CABG.  No chest pain or shortness of breath.    REVIEW OF SYSTEMS    Constitutional:    No fever, no weight loss  Skin:     No rash  Otolaryngeal:    No difficulty swallowing  Cardiovascular: See HPI.  Pulmonary:    No cough, no sputum production    Personal History     Social History:    reports that he quit smoking about 53 years ago. He has never used smokeless tobacco. He reports that he does not currently use alcohol. He reports that he does not use drugs.    Home Medications:  Current Outpatient Medications on File Prior to Visit   Medication Sig    atorvastatin (LIPITOR) 40 MG tablet Take 1 tablet by mouth Daily.    [DISCONTINUED] aspirin 81 MG EC tablet Take 1 tablet by mouth Daily. (Patient not taking: Reported on 10/5/2023)     No current facility-administered medications on file prior to visit.       Past Medical History:   Diagnosis Date    Atrial fibrillation     Bucket-handle tear of medial meniscus of left knee as current injury 05/15/2018    Subsequent encounter    Coronary artery disease     Hyperlipidemia     Osteoarthritis of AC (acromioclavicular) joint 2015    Palpitations     Peripheral tear of medial meniscus of left knee as current injury 2018    Subsequent encouter    Rotator cuff tear 2015    SLAP tear of shoulder 2015       Allergies:  No Known Allergies    Objective    Objective       Vitals:   Heart Rate:  [53] 53  BP: (130)/(67) 130/67  Body mass index is 30.28 kg/m².     PHYSICAL EXAM:    General Appearance:   well developed  well nourished  HENT:   oropharynx moist  lips not cyanotic  Neck:  thyroid not enlarged  supple  Respiratory:  no respiratory distress  normal breath sounds  no rales  Cardiovascular:  no jugular venous  distention  regular rhythm  apical impulse normal  S1 normal, S2 normal  no S3, no S4   no murmur  no rub, no thrill  carotid pulses normal; no bruit  pedal pulses normal  lower extremity edema: none    Skin:   warm, dry  Psychiatric:  judgement and insight appropriate  normal mood and affect        Result Review:  I have personally reviewed the available results from  [x]  Laboratory  [x]  EKG  [x]  Cardiology  [x]  Medications  [x]  Old records  []  Other:     Procedures  Lab Results   Component Value Date    CHOL 143 03/01/2022     Lab Results   Component Value Date    TRIG 127 02/27/2023    TRIG 67 03/01/2022     Lab Results   Component Value Date    HDL 36 (L) 02/27/2023    HDL 44 03/01/2022     Lab Results   Component Value Date     (H) 02/27/2023    LDL 85 03/01/2022     Lab Results   Component Value Date    VLDL 25 02/27/2023    VLDL 14 03/01/2022        Impression/Plan:  1.  CAD s/p CABG stable: Continue aspirin 81 mg once a day.  No chest pain.  2.  Hyperlipidemia: Continue Lipitor 40 mg once a day.  Compliance with medication advised.  Low-fat diet advised.  Monitor lipid and hepatic profile.  3.  History of atrial fibrillation status post ablation stable: No palpitations.           Casey Adams MD   10/05/23   12:11 EDT

## 2023-10-05 ENCOUNTER — OFFICE VISIT (OUTPATIENT)
Dept: CARDIOLOGY | Facility: CLINIC | Age: 78
End: 2023-10-05
Payer: MEDICARE

## 2023-10-05 VITALS
SYSTOLIC BLOOD PRESSURE: 130 MMHG | DIASTOLIC BLOOD PRESSURE: 67 MMHG | WEIGHT: 187.6 LBS | BODY MASS INDEX: 30.15 KG/M2 | HEIGHT: 66 IN | HEART RATE: 53 BPM

## 2023-10-05 DIAGNOSIS — Z95.1 HX OF CABG: ICD-10-CM

## 2023-10-05 DIAGNOSIS — I25.10 CORONARY ARTERY DISEASE INVOLVING NATIVE CORONARY ARTERY OF NATIVE HEART WITHOUT ANGINA PECTORIS: Primary | ICD-10-CM

## 2023-10-05 DIAGNOSIS — E78.2 HYPERLIPEMIA, MIXED: ICD-10-CM

## 2023-10-05 PROCEDURE — 99214 OFFICE O/P EST MOD 30 MIN: CPT | Performed by: SPECIALIST

## 2023-10-05 PROCEDURE — 1159F MED LIST DOCD IN RCRD: CPT | Performed by: SPECIALIST

## 2023-10-05 PROCEDURE — 1160F RVW MEDS BY RX/DR IN RCRD: CPT | Performed by: SPECIALIST

## 2023-10-05 RX ORDER — ASPIRIN 81 MG/1
81 TABLET ORAL DAILY
Qty: 90 TABLET | Refills: 6 | Status: SHIPPED | OUTPATIENT
Start: 2023-10-05

## 2024-03-07 ENCOUNTER — TELEPHONE (OUTPATIENT)
Dept: CARDIOLOGY | Facility: CLINIC | Age: 79
End: 2024-03-07
Payer: MEDICARE

## 2024-03-07 NOTE — TELEPHONE ENCOUNTER
Spoke with patient and informed him he had a Lipid and Hepatic to get drawn before his next appointment. Printed off orders and mailing to patient.

## 2024-03-07 NOTE — TELEPHONE ENCOUNTER
Caller: Michael Garcia    Relationship: Self    Best call back number: [phone]    What orders are you requesting (i.e. lab or imaging): LABS     In what timeframe would the patient need to come in: 1 WEEK BEFORE HIS 6 MONTH APPOINTMENT WITH DR KAUR ON 5.7.24    Where will you receive your lab/imaging services: [facility].     Additional notes: PATIENT CALLED TO INQUIRE IF HE NEEDED LAB WORK. HE WOULD LIKE TO GET HIS LABS DRAWN AT [facility] THE LAST WEEK OF APRIL THE WEEK PRIOR TO SEEING DR KAUR.

## 2024-04-15 RX ORDER — ROSUVASTATIN CALCIUM 20 MG/1
20 TABLET, COATED ORAL DAILY
Qty: 30 TABLET | OUTPATIENT
Start: 2024-04-15

## 2024-04-16 RX ORDER — ATORVASTATIN CALCIUM 40 MG/1
40 TABLET, FILM COATED ORAL DAILY
Qty: 90 TABLET | Refills: 0 | Status: SHIPPED | OUTPATIENT
Start: 2024-04-16

## 2024-04-25 ENCOUNTER — LAB (OUTPATIENT)
Dept: LAB | Facility: HOSPITAL | Age: 79
End: 2024-04-25
Payer: MEDICARE

## 2024-04-25 DIAGNOSIS — E78.2 HYPERLIPEMIA, MIXED: ICD-10-CM

## 2024-04-25 LAB
ALBUMIN SERPL-MCNC: 4.5 G/DL (ref 3.5–5.2)
ALP SERPL-CCNC: 46 U/L (ref 39–117)
ALT SERPL W P-5'-P-CCNC: 20 U/L (ref 1–41)
AST SERPL-CCNC: 16 U/L (ref 1–40)
BILIRUB CONJ SERPL-MCNC: <0.2 MG/DL (ref 0–0.3)
BILIRUB INDIRECT SERPL-MCNC: NORMAL MG/DL
BILIRUB SERPL-MCNC: 0.4 MG/DL (ref 0–1.2)
CHOLEST SERPL-MCNC: 148 MG/DL (ref 0–200)
HDLC SERPL-MCNC: 40 MG/DL (ref 40–60)
LDLC SERPL CALC-MCNC: 92 MG/DL (ref 0–100)
LDLC/HDLC SERPL: 2.29 {RATIO}
PROT SERPL-MCNC: 7.7 G/DL (ref 6–8.5)
TRIGL SERPL-MCNC: 83 MG/DL (ref 0–150)
VLDLC SERPL-MCNC: 16 MG/DL (ref 5–40)

## 2024-04-25 PROCEDURE — 36415 COLL VENOUS BLD VENIPUNCTURE: CPT

## 2024-04-25 PROCEDURE — 80076 HEPATIC FUNCTION PANEL: CPT

## 2024-04-25 PROCEDURE — 80061 LIPID PANEL: CPT

## 2024-04-26 ENCOUNTER — TELEPHONE (OUTPATIENT)
Dept: CARDIOLOGY | Facility: CLINIC | Age: 79
End: 2024-04-26
Payer: MEDICARE

## 2024-04-26 NOTE — TELEPHONE ENCOUNTER
----- Message from Kiah RAMSAY sent at 4/26/2024  7:35 AM EDT -----    ----- Message -----  From: Emilee Mesa APRN  Sent: 4/25/2024   5:39 PM EDT  To: Maine Mckeon    Lipid panel is improved, continue current meds

## 2024-05-07 ENCOUNTER — OFFICE VISIT (OUTPATIENT)
Dept: CARDIOLOGY | Facility: CLINIC | Age: 79
End: 2024-05-07
Payer: MEDICARE

## 2024-05-07 VITALS
HEART RATE: 66 BPM | WEIGHT: 191 LBS | BODY MASS INDEX: 30.7 KG/M2 | DIASTOLIC BLOOD PRESSURE: 70 MMHG | HEIGHT: 66 IN | SYSTOLIC BLOOD PRESSURE: 132 MMHG

## 2024-05-07 DIAGNOSIS — Z95.1 HX OF CABG: ICD-10-CM

## 2024-05-07 DIAGNOSIS — I25.10 CORONARY ARTERY DISEASE INVOLVING NATIVE CORONARY ARTERY OF NATIVE HEART WITHOUT ANGINA PECTORIS: Primary | ICD-10-CM

## 2024-05-07 DIAGNOSIS — E78.2 HYPERLIPEMIA, MIXED: ICD-10-CM

## 2024-05-07 PROCEDURE — 1159F MED LIST DOCD IN RCRD: CPT | Performed by: SPECIALIST

## 2024-05-07 PROCEDURE — 99214 OFFICE O/P EST MOD 30 MIN: CPT | Performed by: SPECIALIST

## 2024-05-07 PROCEDURE — 1160F RVW MEDS BY RX/DR IN RCRD: CPT | Performed by: SPECIALIST

## 2024-05-20 ENCOUNTER — HOSPITAL ENCOUNTER (EMERGENCY)
Facility: HOSPITAL | Age: 79
Discharge: HOME | End: 2024-05-20
Attending: EMERGENCY MEDICINE | Admitting: EMERGENCY MEDICINE
Payer: MEDICARE

## 2024-05-20 ENCOUNTER — APPOINTMENT (OUTPATIENT)
Dept: GENERAL RADIOLOGY | Facility: HOSPITAL | Age: 79
End: 2024-05-20
Payer: MEDICARE

## 2024-05-20 ENCOUNTER — APPOINTMENT (OUTPATIENT)
Dept: CT IMAGING | Facility: HOSPITAL | Age: 79
End: 2024-05-20
Payer: MEDICARE

## 2024-05-20 VITALS
DIASTOLIC BLOOD PRESSURE: 78 MMHG | OXYGEN SATURATION: 96 % | BODY MASS INDEX: 30.82 KG/M2 | RESPIRATION RATE: 18 BRPM | WEIGHT: 191.8 LBS | HEIGHT: 66 IN | SYSTOLIC BLOOD PRESSURE: 140 MMHG | TEMPERATURE: 99.1 F | HEART RATE: 74 BPM

## 2024-05-20 DIAGNOSIS — S09.90XA INJURY OF HEAD, INITIAL ENCOUNTER: Primary | ICD-10-CM

## 2024-05-20 LAB
ALBUMIN SERPL-MCNC: 4.1 G/DL (ref 3.5–5.2)
ALBUMIN/GLOB SERPL: 1.2 G/DL
ALP SERPL-CCNC: 49 U/L (ref 39–117)
ALT SERPL W P-5'-P-CCNC: 132 U/L (ref 1–41)
ANION GAP SERPL CALCULATED.3IONS-SCNC: 12.3 MMOL/L (ref 5–15)
AST SERPL-CCNC: 90 U/L (ref 1–40)
BASOPHILS # BLD AUTO: 0.05 10*3/MM3 (ref 0–0.2)
BASOPHILS NFR BLD AUTO: 0.5 % (ref 0–1.5)
BILIRUB SERPL-MCNC: 0.6 MG/DL (ref 0–1.2)
BUN SERPL-MCNC: 35 MG/DL (ref 8–23)
BUN/CREAT SERPL: 31.8 (ref 7–25)
CALCIUM SPEC-SCNC: 9.5 MG/DL (ref 8.6–10.5)
CHLORIDE SERPL-SCNC: 103 MMOL/L (ref 98–107)
CO2 SERPL-SCNC: 24.7 MMOL/L (ref 22–29)
CREAT SERPL-MCNC: 1.1 MG/DL (ref 0.76–1.27)
DEPRECATED RDW RBC AUTO: 41.1 FL (ref 37–54)
EGFRCR SERPLBLD CKD-EPI 2021: 67.9 ML/MIN/1.73
EOSINOPHIL # BLD AUTO: 0.04 10*3/MM3 (ref 0–0.4)
EOSINOPHIL NFR BLD AUTO: 0.4 % (ref 0.3–6.2)
ERYTHROCYTE [DISTWIDTH] IN BLOOD BY AUTOMATED COUNT: 12.3 % (ref 12.3–15.4)
GLOBULIN UR ELPH-MCNC: 3.5 GM/DL
GLUCOSE SERPL-MCNC: 135 MG/DL (ref 65–99)
HCT VFR BLD AUTO: 41.2 % (ref 37.5–51)
HGB BLD-MCNC: 14 G/DL (ref 13–17.7)
HOLD SPECIMEN: NORMAL
HOLD SPECIMEN: NORMAL
IMM GRANULOCYTES # BLD AUTO: 0.03 10*3/MM3 (ref 0–0.05)
IMM GRANULOCYTES NFR BLD AUTO: 0.3 % (ref 0–0.5)
LYMPHOCYTES # BLD AUTO: 3.1 10*3/MM3 (ref 0.7–3.1)
LYMPHOCYTES NFR BLD AUTO: 29.4 % (ref 19.6–45.3)
MAGNESIUM SERPL-MCNC: 2.1 MG/DL (ref 1.6–2.4)
MCH RBC QN AUTO: 30.9 PG (ref 26.6–33)
MCHC RBC AUTO-ENTMCNC: 34 G/DL (ref 31.5–35.7)
MCV RBC AUTO: 90.9 FL (ref 79–97)
MONOCYTES # BLD AUTO: 0.53 10*3/MM3 (ref 0.1–0.9)
MONOCYTES NFR BLD AUTO: 5 % (ref 5–12)
NEUTROPHILS NFR BLD AUTO: 6.8 10*3/MM3 (ref 1.7–7)
NEUTROPHILS NFR BLD AUTO: 64.4 % (ref 42.7–76)
NRBC BLD AUTO-RTO: 0 /100 WBC (ref 0–0.2)
PLAT MORPH BLD: NORMAL
PLATELET # BLD AUTO: 175 10*3/MM3 (ref 140–450)
PMV BLD AUTO: 10 FL (ref 6–12)
POTASSIUM SERPL-SCNC: 5 MMOL/L (ref 3.5–5.2)
PROT SERPL-MCNC: 7.6 G/DL (ref 6–8.5)
RBC # BLD AUTO: 4.53 10*6/MM3 (ref 4.14–5.8)
RBC MORPH BLD: NORMAL
SODIUM SERPL-SCNC: 140 MMOL/L (ref 136–145)
TROPONIN T SERPL HS-MCNC: 16 NG/L
WBC MORPH BLD: NORMAL
WBC NRBC COR # BLD AUTO: 10.55 10*3/MM3 (ref 3.4–10.8)
WHOLE BLOOD HOLD COAG: NORMAL
WHOLE BLOOD HOLD SPECIMEN: NORMAL

## 2024-05-20 PROCEDURE — 99284 EMERGENCY DEPT VISIT MOD MDM: CPT

## 2024-05-20 PROCEDURE — 93005 ELECTROCARDIOGRAM TRACING: CPT | Performed by: EMERGENCY MEDICINE

## 2024-05-20 PROCEDURE — 80053 COMPREHEN METABOLIC PANEL: CPT

## 2024-05-20 PROCEDURE — 83735 ASSAY OF MAGNESIUM: CPT

## 2024-05-20 PROCEDURE — 85007 BL SMEAR W/DIFF WBC COUNT: CPT

## 2024-05-20 PROCEDURE — 70450 CT HEAD/BRAIN W/O DYE: CPT

## 2024-05-20 PROCEDURE — 36415 COLL VENOUS BLD VENIPUNCTURE: CPT

## 2024-05-20 PROCEDURE — 93005 ELECTROCARDIOGRAM TRACING: CPT

## 2024-05-20 PROCEDURE — 85025 COMPLETE CBC W/AUTO DIFF WBC: CPT

## 2024-05-20 PROCEDURE — 71045 X-RAY EXAM CHEST 1 VIEW: CPT

## 2024-05-20 PROCEDURE — 84484 ASSAY OF TROPONIN QUANT: CPT

## 2024-05-20 RX ORDER — BUTALBITAL, ACETAMINOPHEN AND CAFFEINE 50; 325; 40 MG/1; MG/1; MG/1
1 TABLET ORAL EVERY 6 HOURS PRN
Qty: 12 TABLET | Refills: 0 | Status: SHIPPED | OUTPATIENT
Start: 2024-05-20

## 2024-05-20 RX ORDER — OXYCODONE HYDROCHLORIDE AND ACETAMINOPHEN 5; 325 MG/1; MG/1
1 TABLET ORAL ONCE
Status: COMPLETED | OUTPATIENT
Start: 2024-05-20 | End: 2024-05-20

## 2024-05-20 RX ORDER — SODIUM CHLORIDE 0.9 % (FLUSH) 0.9 %
10 SYRINGE (ML) INJECTION AS NEEDED
Status: DISCONTINUED | OUTPATIENT
Start: 2024-05-20 | End: 2024-05-20 | Stop reason: HOSPADM

## 2024-05-20 RX ADMIN — OXYCODONE AND ACETAMINOPHEN 1 TABLET: 5; 325 TABLET ORAL at 19:22

## 2024-05-20 NOTE — ED PROVIDER NOTES
Time: 4:06 PM EDT  Date of encounter:  5/20/2024  Independent Historian/Clinical History and Information was obtained by:   Patient    History is limited by: N/A    Chief Complaint: Head injury      History of Present Illness:  Patient is a 78 y.o. year old male who presents to the emergency department for evaluation of head injury.  Patient reports he was outside hanging a bird feeder when he felt dizzy and fell backwards causing a laceration to the back of his head.  He states the dizziness began yesterday but denies confusion or unilateral weakness.  Denies nausea, vomiting, fever, recent illness.  Denies LOC.  Patient is not currently on blood thinners.  DARA Saul FNP-C/ENP    HPI    Patient Care Team  Primary Care Provider: Provider, No Known    Past Medical History:     No Known Allergies  Past Medical History:   Diagnosis Date    Atrial fibrillation     Bucket-handle tear of medial meniscus of left knee as current injury 05/15/2018    Subsequent encounter    Coronary artery disease     Hyperlipidemia     Osteoarthritis of AC (acromioclavicular) joint 06/11/2015    Palpitations     Peripheral tear of medial meniscus of left knee as current injury 05/03/2018    Subsequent encouter    Rotator cuff tear 03/19/2015    SLAP tear of shoulder 03/19/2015     Past Surgical History:   Procedure Laterality Date    CARDIAC ABLATION      CARDIAC CATHETERIZATION      CARDIAC SURGERY      Heart Bypass    CORONARY ARTERY BYPASS GRAFT      KNEE ARTHROSCOPY W/ PARTIAL MEDIAL MENISCECTOMY      Meniscectomy, knee, medial    SHOULDER SURGERY      Arthroscopic Shoulder Surgery     Family History   Problem Relation Age of Onset    Diabetes Mother     No Known Problems Father        Home Medications:  Prior to Admission medications    Medication Sig Start Date End Date Taking? Authorizing Provider   aspirin 81 MG EC tablet Take 1 tablet by mouth Daily. 10/5/23   Casey Adams MD   atorvastatin (LIPITOR) 40 MG tablet TAKE 1  "TABLET BY MOUTH DAILY 24   Kristen GordonDARA        Social History:   Social History     Tobacco Use    Smoking status: Former     Current packs/day: 0.00     Types: Cigarettes     Quit date: 1970     Years since quittin.4    Smokeless tobacco: Never   Vaping Use    Vaping status: Never Used   Substance Use Topics    Alcohol use: Not Currently    Drug use: Never         Review of Systems:  Review of Systems   Constitutional:  Negative for chills and fever.   HENT:  Negative for congestion, rhinorrhea and sore throat.    Eyes:  Negative for pain and visual disturbance.   Respiratory:  Negative for apnea, cough, chest tightness and shortness of breath.    Cardiovascular:  Negative for chest pain and palpitations.   Gastrointestinal:  Negative for abdominal pain, diarrhea, nausea and vomiting.   Genitourinary:  Negative for difficulty urinating and dysuria.   Musculoskeletal:  Negative for joint swelling and myalgias.   Skin:  Positive for wound. Negative for color change.   Neurological:  Positive for dizziness. Negative for seizures and headaches.   Psychiatric/Behavioral: Negative.     All other systems reviewed and are negative.       Physical Exam:  /81   Pulse 73   Temp 99.1 °F (37.3 °C) (Oral)   Resp 20   Ht 167.6 cm (66\")   Wt 87 kg (191 lb 12.8 oz)   SpO2 96%   BMI 30.96 kg/m²     Physical Exam  Vitals and nursing note reviewed.   Constitutional:       General: He is not in acute distress.     Appearance: Normal appearance. He is not toxic-appearing.   HENT:      Head: Normocephalic and atraumatic.      Jaw: There is normal jaw occlusion.   Eyes:      General: Lids are normal.      Extraocular Movements: Extraocular movements intact.      Conjunctiva/sclera: Conjunctivae normal.      Pupils: Pupils are equal, round, and reactive to light.   Cardiovascular:      Rate and Rhythm: Normal rate and regular rhythm.      Pulses: Normal pulses.      Heart sounds: Normal heart sounds. "   Pulmonary:      Effort: Pulmonary effort is normal. No respiratory distress.      Breath sounds: Normal breath sounds. No wheezing or rhonchi.   Abdominal:      General: Abdomen is flat. There is no distension.      Palpations: Abdomen is soft.      Tenderness: There is no abdominal tenderness. There is no guarding or rebound.   Musculoskeletal:         General: Normal range of motion.      Cervical back: Normal range of motion and neck supple.      Right lower leg: No edema.      Left lower leg: No edema.   Skin:     General: Skin is warm and dry.      Coloration: Skin is not cyanotic.      Comments: (+) 3 cm laceration to occiput   Neurological:      Mental Status: He is alert and oriented to person, place, and time. Mental status is at baseline.   Psychiatric:         Attention and Perception: Attention and perception normal.         Mood and Affect: Mood normal.                  Procedures:  Laceration Repair    Date/Time: 5/20/2024 7:12 PM    Performed by: Joyce Garcia MD  Authorized by: Joyce Garcia MD    Consent:     Consent obtained:  Verbal    Consent given by:  Patient    Risks, benefits, and alternatives were discussed: yes    Laceration details:     Location:  Scalp    Length (cm):  2    The patient presented with a laceration in need of repair. See laceration repair note for details. The wound was irrigated with copious normal saline irrigation.  2 staples were used to approximate the wound edges. Tetanus was not given. The patient tolerated the procedure well. Acute bleeding has ceased and the wound was approximated in the emergency department. Patient was counseled to keep the wound clean, dry, and out of the sun. Patient was counseled to change dressings daily. Patient was advised to return to the ED for worsening erythema, pain, swelling, fever, excessive drainage or signs of infection. They were counseled to follow up for suture removal as described in the discharge instructions.  Patient verbalizes understanding and agrees to follow up as instructed.    Medical Decision Making:      Comorbidities that affect care:    Atrial fibrillation    External Notes reviewed:    Previous Clinic Note: Patient was last seen in clinic for coronary artery disease.      The following orders were placed and all results were independently analyzed by me:  Orders Placed This Encounter   Procedures    Laceration Repair    XR Chest 1 View    CT Head Without Contrast    [place] Draw    Comprehensive Metabolic Panel    Single High Sensitivity Troponin T    Magnesium    Urinalysis With Microscopic If Indicated (No Culture) - Urine, Clean Catch    CBC Auto Differential    Scan Slide    NPO Diet NPO Type: Strict NPO    Undress & Gown    Continuous Pulse Oximetry    Vital Signs    Orthostatic Blood Pressure    Oxygen Therapy- Nasal Cannula; Titrate 1-6 LPM Per SpO2; 90 - 95%    POC Glucose Once    ECG 12 Lead ED Triage Standing Order; Weak / Dizzy / AMS    Insert Peripheral IV    Fall Precautions    CBC & Differential    Green Top (Gel)    Lavender Top    Gold Top - SST    Light Blue Top       Medications Given in the Emergency Department:  Medications   sodium chloride 0.9 % flush 10 mL (has no administration in time range)        ED Course:    ED Course as of 05/20/24 1914   Mon May 20, 2024   1605 -- PROVIDER IN TRIAGE NOTE ---    The patient was evaluated by Zenaida tatum in triage. Orders were placed and the patient is currently awaiting disposition.    [CB]      ED Course User Index  [CB] Zenaida Gracia APRN       Labs:    Lab Results (last 24 hours)       Procedure Component Value Units Date/Time    CBC & Differential [[id number]]  (Normal) Collected: 05/20/24 1610    Specimen: Blood from Arm, Right Updated: 05/20/24 1650    Narrative:      The following orders were created for panel order CBC & Differential.  Procedure                               Abnormality         Status                      ---------                               -----------         ------                     CBC Auto Differential[[id number]]        Normal              Final result               Scan Slide[[id number]]                   Normal              Final result                 Please view results for these tests on the individual orders.    Comprehensive Metabolic Panel [[id number]]  (Abnormal) Collected: 05/20/24 1610    Specimen: Blood from Arm, Right Updated: 05/20/24 1652     Glucose 135 mg/dL      BUN 35 mg/dL      Creatinine 1.10 mg/dL      Sodium 140 mmol/L      Potassium 5.0 mmol/L      Chloride 103 mmol/L      CO2 24.7 mmol/L      Calcium 9.5 mg/dL      Total Protein 7.6 g/dL      Albumin 4.1 g/dL      ALT (SGPT) 132 U/L      AST (SGOT) 90 U/L      Alkaline Phosphatase 49 U/L      Total Bilirubin 0.6 mg/dL      Globulin 3.5 gm/dL      A/G Ratio 1.2 g/dL      BUN/Creatinine Ratio 31.8     Anion Gap 12.3 mmol/L      eGFR 67.9 mL/min/1.73     Narrative:      GFR Normal >60  Chronic Kidney Disease <60  Kidney Failure <15    The GFR formula is only valid for adults with stable renal function between ages 18 and 70.    Single High Sensitivity Troponin T [[id number]]  (Normal) Collected: 05/20/24 1610    Specimen: Blood from Arm, Right Updated: 05/20/24 1652     HS Troponin T 16 ng/L     Narrative:      High Sensitive Troponin T Reference Range:  <14.0 ng/L- Negative Female for AMI  <22.0 ng/L- Negative Male for AMI  >=14 - Abnormal Female indicating possible myocardial injury.  >=22 - Abnormal Male indicating possible myocardial injury.   Clinicians would have to utilize clinical acumen, EKG, Troponin, and serial changes to determine if it is an Acute Myocardial Infarction or myocardial injury due to an underlying chronic condition.         Magnesium [[id number]]  (Normal) Collected: 05/20/24 1610    Specimen: Blood from Arm, Right Updated: 05/20/24 1652     Magnesium 2.1 mg/dL     CBC Auto Differential [[id number]]  (Normal)  Collected: 05/20/24 1610    Specimen: Blood from Arm, Right Updated: 05/20/24 1636     WBC 10.55 10*3/mm3      RBC 4.53 10*6/mm3      Hemoglobin 14.0 g/dL      Hematocrit 41.2 %      MCV 90.9 fL      MCH 30.9 pg      MCHC 34.0 g/dL      RDW 12.3 %      RDW-SD 41.1 fl      MPV 10.0 fL      Platelets 175 10*3/mm3      Neutrophil % 64.4 %      Lymphocyte % 29.4 %      Monocyte % 5.0 %      Eosinophil % 0.4 %      Basophil % 0.5 %      Immature Grans % 0.3 %      Neutrophils, Absolute 6.80 10*3/mm3      Lymphocytes, Absolute 3.10 10*3/mm3      Monocytes, Absolute 0.53 10*3/mm3      Eosinophils, Absolute 0.04 10*3/mm3      Basophils, Absolute 0.05 10*3/mm3      Immature Grans, Absolute 0.03 10*3/mm3      nRBC 0.0 /100 WBC     Scan Slide [[id number]]  (Normal) Collected: 05/20/24 1610    Specimen: Blood from Arm, Right Updated: 05/20/24 1650     RBC Morphology Normal     WBC Morphology Normal     Platelet Morphology Normal             Imaging:    XR Chest 1 View    Result Date: 5/20/2024  DATE OF EXAM: 5/20/2024 4:27 PM  PROCEDURE: XR CHEST 1 VW-  INDICATIONS: Weak/Dizzy/AMS triage protocol  COMPARISON: No Comparisons Available  TECHNIQUE: Single radiographic view of the chest was obtained.  FINDINGS: No focal or diffuse pulmonary infiltrate is identified. No pleural effusion or pneumothorax is seen.  Heart size and mediastinal contours appear within normal limits. Status post median sternotomy and CABG.      No radiographic findings of acute cardiopulmonary abnormality.   Electronically Signed By-Bon Cha MD On:5/20/2024 4:40 PM      CT Head Without Contrast    Result Date: 5/20/2024  CT HEAD WO CONTRAST-  Date of Exam: 5/20/2024 4:23 PM  Indication: Dizzy, fall with head injury.  Comparison: None available.  Technique: Axial CT images were obtained of the head without contrast administration.  Reconstructed coronal and sagittal images were also obtained. Automated exposure control and iterative construction methods  were used.   Findings: There is mild generalized parenchymal volume loss. There is no evidence of acute infarct or hemorrhage. No abnormal extra-axial fluid collections are identified. No mass effect or hydrocephalus.  Globes and orbits are within normal limits. The visualized paranasal sinuses and mastoid air cells are clear. No acute skull fracture.      Impression:  1. No acute intracranial abnormality.    Electronically Signed By-Trenton Galarza MD On:5/20/2024 4:35 PM         Differential Diagnosis and Discussion:    Trauma:  Differential diagnosis considered but not limited to were subarachnoid hemorrhage, intracranial bleeding, pneumothorax, cardiac contusion, lung contusion, intra-abdominal bleeding, and compartment syndrome of any extremity or other significant traumatic pathology    CT scan radiology impression was interpreted by me.    MDM     The patient presents with an acute closed head injury. [place] Criteria for CT scan was followed. CT of the head is required for patients with minor head injury and any one of the following (including a GCS of 15):     1.                     Headache   2.                     Vomiting   3.                     Older than 60 years   4.                     Drug or Alcohol intoxication   5.                     Persistent anterograde amnesia   6.                     Visible trauma above the clavicle   7.                     Seizure.      The CT scan of the head shows no acute intracranial abnormalities. This includes subarachnoid hemorrhage, subdural hematoma, epidural hematoma, or calvarial fractures. The patient is neurologically intact, has a normal mental status and is ambulatory in the ED. The patient has had no seizure like activity in the ED. The vital signs have been stable. The patient's condition is stable and appropriate for discharge. The patient made aware of the symptoms of post-concussive syndrome. The patient was counseled to return to the ED for motor  or sensory deficit, altered mental status, uncontrollable headache, visual changes, seizures, or for any re-examination of new symptoms. The patient will pursue further outpatient evaluation with the primary care physician or other designated or consulting position as indicated in the discharge instructions as a follow up.            Patient Care Considerations:    I considered antibiotics, however the wound was irrigated with copious saline.      Consultants/Shared Management Plan:    None    Social Determinants of Health:    Patient is independent, reliable, and has access to care.       Disposition and Care Coordination:    Discharged: I considered escalation of care by admitting this patient to the hospital, however patient is able to ambulate with no difficulty.    I have explained the patient´s condition, diagnoses and treatment plan based on the information available to me at this time. I have answered questions and addressed any concerns. The patient has a good  understanding of the patient´s diagnosis, condition, and treatment plan as can be expected at this point. The vital signs have been stable. The patient´s condition is stable and appropriate for discharge from the emergency department.      The patient will pursue further outpatient evaluation with the primary care physician or other designated or consulting physician as outlined in the discharge instructions. They are agreeable to this plan of care and follow-up instructions have been explained in detail. The patient has received these instructions in written format and has expressed an understanding of the discharge instructions. The patient is aware that any significant change in condition or worsening of symptoms should prompt an immediate return to this or the closest emergency department or call to 911.  I have explained discharge medications and the need for follow up with the patient/caretakers. This was also printed in the discharge  instructions. Patient was discharged with the following medications and follow up:      Medication List      No changes were made to your prescriptions during this visit.      Provider, No Known  [facility]  [address] [place] [id number]    In 2 days         Final diagnoses:   Injury of head, initial encounter        ED Disposition       ED Disposition   Discharge    Condition   Stable    Comment   --               This medical record created using voice recognition software.             Joyce Garcia MD  05/20/24 [id number]

## 2024-05-20 NOTE — ED NOTES
Abrasion to posterior, right side of scalp, cleansed with NS, patted dry, applied ice pack per request for discomfort. No other needs voiced.

## 2024-05-21 LAB
QT INTERVAL: 420 MS
QTC INTERVAL: 465 MS

## 2024-05-24 ENCOUNTER — OFFICE VISIT (OUTPATIENT)
Dept: FAMILY MEDICINE CLINIC | Facility: CLINIC | Age: 79
End: 2024-05-24
Payer: MEDICARE

## 2024-05-24 VITALS
BODY MASS INDEX: 29.86 KG/M2 | WEIGHT: 185.8 LBS | HEIGHT: 66 IN | DIASTOLIC BLOOD PRESSURE: 70 MMHG | SYSTOLIC BLOOD PRESSURE: 128 MMHG | HEART RATE: 69 BPM | OXYGEN SATURATION: 98 %

## 2024-05-24 DIAGNOSIS — Z00.00 MEDICARE ANNUAL WELLNESS VISIT, SUBSEQUENT: Primary | ICD-10-CM

## 2024-05-24 DIAGNOSIS — R44.3 HALLUCINATIONS: ICD-10-CM

## 2024-05-24 DIAGNOSIS — R41.0 CONFUSION: ICD-10-CM

## 2024-05-24 DIAGNOSIS — S09.90XA RECENT HEAD INJURY, INITIAL ENCOUNTER: ICD-10-CM

## 2024-05-24 DIAGNOSIS — H53.8 BLURRY VISION: ICD-10-CM

## 2024-05-24 DIAGNOSIS — Z13.29 SCREENING FOR THYROID DISORDER: ICD-10-CM

## 2024-05-24 DIAGNOSIS — I25.10 CORONARY ARTERY DISEASE INVOLVING NATIVE CORONARY ARTERY OF NATIVE HEART WITHOUT ANGINA PECTORIS: ICD-10-CM
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
## 2024-05-30 ENCOUNTER — OFFICE VISIT (OUTPATIENT)
Dept: FAMILY MEDICINE CLINIC | Facility: CLINIC | Age: 79
End: 2024-05-30
Payer: MEDICARE

## 2024-05-30 VITALS
SYSTOLIC BLOOD PRESSURE: 121 MMHG | WEIGHT: 185.2 LBS | OXYGEN SATURATION: 95 % | DIASTOLIC BLOOD PRESSURE: 67 MMHG | HEART RATE: 68 BPM | HEIGHT: 66 IN | BODY MASS INDEX: 29.77 KG/M2

## 2024-05-30 DIAGNOSIS — S01.01XD SCALP LACERATION, SUBSEQUENT ENCOUNTER: Primary | ICD-10-CM

## 2024-05-30 DIAGNOSIS — S09.90XD: ICD-10-CM

## 2024-05-30 PROCEDURE — 99213 OFFICE O/P EST LOW 20 MIN: CPT

## 2024-05-30 PROCEDURE — G2211 COMPLEX E/M VISIT ADD ON: HCPCS

## 2024-05-30 PROCEDURE — 1126F AMNT PAIN NOTED NONE PRSNT: CPT

## 2024-05-30 PROCEDURE — 15853 REMOVAL SUTR/STAPL XREQ ANES: CPT

## 2024-05-30 PROCEDURE — 1160F RVW MEDS BY RX/DR IN RCRD: CPT

## 2024-05-30 PROCEDURE — 1159F MED LIST DOCD IN RCRD: CPT

## 2024-05-30 NOTE — PROGRESS NOTES
Chief Complaint  Suture / Staple Removal, Hallucinations, and Altered Mental Status    SUBJECTIVE  Michael Garcia presents to [facility]    History of Present Illness  Patient is a 78-year-old male who presents today for follow-up on scalp laceration recent head injury.  He is accompanied by his wife.  Wife reports that he has had better improvement in his balance and mobility.  Patient is still having some confusion and hallucinations.  Patient has been referred to neurology and had MRI ordered which are still pending.  Patient is due for staple removal for scalp laceration.     Past Medical History:   Diagnosis Date    Atrial fibrillation     Bucket-handle tear of medial meniscus of left knee as current injury 05/15/2018    Subsequent encounter    Coronary artery disease     Hyperlipidemia     Osteoarthritis of AC (acromioclavicular) joint 06/11/2015    Palpitations     Peripheral tear of medial meniscus of left knee as current injury 05/03/2018    Subsequent encouter    Rotator cuff tear 03/19/2015    SLAP tear of shoulder 03/19/2015      Family History   Problem Relation Age of Onset    Diabetes Mother     No Known Problems Father       Past Surgical History:   Procedure Laterality Date    CARDIAC ABLATION      CARDIAC CATHETERIZATION      CARDIAC SURGERY      Heart Bypass    CORONARY ARTERY BYPASS GRAFT      KNEE ARTHROSCOPY W/ PARTIAL MEDIAL MENISCECTOMY      Meniscectomy, knee, medial    SHOULDER SURGERY      Arthroscopic Shoulder Surgery        Current Outpatient Medications:     aspirin 81 MG EC tablet, Take 1 tablet by mouth Daily., Disp: 90 tablet, Rfl: 6    atorvastatin (LIPITOR) 40 MG tablet, TAKE 1 TABLET BY MOUTH DAILY, Disp: 90 tablet, Rfl: 0    butalbital-acetaminophen-caffeine (FIORICET, ESGIC) -40 MG per tablet, Take 1 tablet by mouth Every 6 (Six) Hours As Needed for Headache., Disp: 12 tablet, Rfl: 0    OBJECTIVE  Vital Signs:   /67   Pulse 68    "Ht 167.6 cm (66\")   Wt 84 kg (185 lb 3.2 oz)   SpO2 95%   BMI 29.89 kg/m²    Estimated body mass index is 29.89 kg/m² as calculated from the following:    Height as of this encounter: 167.6 cm (66\").    Weight as of this encounter: 84 kg (185 lb 3.2 oz).     Wt Readings from Last 3 Encounters:   05/30/24 84 kg (185 lb 3.2 oz)   05/24/24 84.3 kg (185 lb 12.8 oz)   05/20/24 87 kg (191 lb 12.8 oz)     BP Readings from Last 3 Encounters:   05/30/24 121/67   05/24/24 128/70   05/20/24 140/78       Physical Exam  Vitals reviewed.   Constitutional:       General: He is not in acute distress.  HENT:      Head: Normocephalic and atraumatic.   Eyes:      Conjunctiva/sclera: Conjunctivae normal.   Cardiovascular:      Rate and Rhythm: Normal rate and regular rhythm.      Heart sounds: Normal heart sounds.   Pulmonary:      Effort: Pulmonary effort is normal.      Breath sounds: Normal breath sounds.   Skin:     General: Skin is warm and dry.             Comments: Scalp laceration, staples removed   Neurological:      Mental Status: He is alert. Mental status is at baseline.      Motor: Weakness present.   Psychiatric:         Mood and Affect: Mood normal.          Result Review    Common labs          4/25/2024    07:56 5/20/2024    16:10   Common Labs   Glucose  135    BUN  35    Creatinine  1.10    Sodium  140    Potassium  5.0    Chloride  103    Calcium  9.5    Albumin 4.5  4.1    Total Bilirubin 0.4  0.6    Alkaline Phosphatase 46  49    AST (SGOT) 16  90    ALT (SGPT) 20  132    WBC  10.55    Hemoglobin  14.0    Hematocrit  41.2    Platelets  175    Total Cholesterol 148     Triglycerides 83     HDL Cholesterol 40     LDL Cholesterol  92         XR Chest 1 View    Result Date: 5/20/2024  No radiographic findings of acute cardiopulmonary abnormality.   Electronically Signed By-Bon Cha MD On:5/20/2024 4:40 PM      CT Head Without Contrast    Result Date: 5/20/2024  Impression:  1. No acute intracranial " abnormality.    Electronically Signed By-Trenton Galarza MD On:5/20/2024 4:35 PM          The above data has been reviewed by DARA Krishna 05/30/2024 11:34 EDT.    Suture Removal    Date/Time: 5/30/2024 1:54 PM    Performed by: Summer Rodney APRN  Authorized by: Summer Rodney APRN  Body area: head/neck  Location details: scalp  Wound Appearance: clean  Staples Removed: 2  Patient tolerance: patient tolerated the procedure well with no immediate complications  Comments: Edges approximated , no redness or drainage noted           Patient Care Team:  Summer Rodney APRN as PCP - General (Nurse Practitioner)            ASSESSMENT & PLAN    Diagnoses and all orders for this visit:    1. Scalp laceration, subsequent encounter (Primary)  Comments:  2 staples removed, tolerated procedure well, normal wound healing noted  Orders:  -     Suture Removal    2. Recent head injury, subsequent encounter  Comments:  Has had some improvement in his activity level, still having, patient reports injections, appointment for neurology         Tobacco Use: Medium Risk (5/30/2024)    Patient History     Smoking Tobacco Use: Former     Smokeless Tobacco Use: Never     Passive Exposure: Not on file       Follow Up     Return in about 3 months (around 8/30/2024) for Next scheduled follow up.      Patient was given instructions and counseling regarding his condition or for health maintenance advice. Please see specific information pulled into the AVS if appropriate.   I have reviewed information obtained and documented by others and I have confirmed the accuracy of this documented note.    DARA Krishna

## 2024-06-17 ENCOUNTER — HOSPITAL ENCOUNTER (OUTPATIENT)
Dept: MRI IMAGING | Facility: HOSPITAL | Age: 79
Discharge: HOME | End: 2024-06-17
Payer: MEDICARE

## 2024-06-17 DIAGNOSIS — R41.0 CONFUSION: ICD-10-CM

## 2024-06-17 DIAGNOSIS — S09.90XA RECENT HEAD INJURY, INITIAL ENCOUNTER: ICD-10-CM

## 2024-06-17 DIAGNOSIS — R44.3 HALLUCINATIONS: ICD-10-CM

## 2024-06-17 PROCEDURE — 70553 MRI BRAIN STEM W/O & W/DYE: CPT

## 2024-06-17 PROCEDURE — 0 GADOBENATE DIMEGLUMINE 529 MG/ML SOLUTION

## 2024-06-17 PROCEDURE — A9577 INJ MULTIHANCE: HCPCS

## 2024-06-17 RX ADMIN — GADOBENATE DIMEGLUMINE 15 ML: 529 INJECTION, SOLUTION INTRAVENOUS at 10:05

## 2024-06-28 ENCOUNTER — OFFICE VISIT (OUTPATIENT)
Dept: FAMILY MEDICINE CLINIC | Facility: CLINIC | Age: 79
End: 2024-06-28
Payer: MEDICARE

## 2024-06-28 VITALS
RESPIRATION RATE: 18 BRPM | OXYGEN SATURATION: 94 % | HEART RATE: 66 BPM | DIASTOLIC BLOOD PRESSURE: 63 MMHG | TEMPERATURE: 98 F | HEIGHT: 66 IN | BODY MASS INDEX: 30.37 KG/M2 | WEIGHT: 189 LBS | SYSTOLIC BLOOD PRESSURE: 119 MMHG

## 2024-06-28 DIAGNOSIS — S01.01XD SCALP LACERATION, SUBSEQUENT ENCOUNTER: Primary | ICD-10-CM

## 2024-06-28 DIAGNOSIS — R41.0 CONFUSION: ICD-10-CM

## 2024-06-28 DIAGNOSIS — S09.90XD: ICD-10-CM

## 2024-06-28 PROCEDURE — 99213 OFFICE O/P EST LOW 20 MIN: CPT

## 2024-06-28 PROCEDURE — 1159F MED LIST DOCD IN RCRD: CPT

## 2024-06-28 PROCEDURE — 1126F AMNT PAIN NOTED NONE PRSNT: CPT

## 2024-06-28 PROCEDURE — 1160F RVW MEDS BY RX/DR IN RCRD: CPT

## 2024-06-28 NOTE — PROGRESS NOTES
Chief Complaint  Laceration (Scalp laceration 1mfu), Head Injury, and Altered Mental Status    SUBJECTIVE  Michael Garcia presents to [facility]    History of Present Illness  Patient is a 78-year-old male who presents today for 1 month follow-up.  Patient had previous fall with head injury and scalp laceration on 5/20/2024.  Patient had his Sutures removed 1 month ago.  Patient denies any issues out of the wound on his head.  Patient reports he is no longer having hallucinations.  His confusion has improved but wife reports he was having forgetfulness prior to his head injury as well.  Patient is still walking with a cane.  Patient reports he occasionally feels off balance.  Patient reports he is still having some blurry vision.  He was seen by his eye doctor and vision check was normal.  He has an appointment with neurology on 7/16/2024.    Past Medical History:   Diagnosis Date    Atrial fibrillation     Bucket-handle tear of medial meniscus of left knee as current injury 05/15/2018    Subsequent encounter    Coronary artery disease     Hyperlipidemia     Osteoarthritis of AC (acromioclavicular) joint 06/11/2015    Palpitations     Peripheral tear of medial meniscus of left knee as current injury 05/03/2018    Subsequent encouter    Rotator cuff tear 03/19/2015    SLAP tear of shoulder 03/19/2015      Family History   Problem Relation Age of Onset    Diabetes Mother     No Known Problems Father       Past Surgical History:   Procedure Laterality Date    CARDIAC ABLATION      CARDIAC CATHETERIZATION      CARDIAC SURGERY      Heart Bypass    CORONARY ARTERY BYPASS GRAFT      KNEE ARTHROSCOPY W/ PARTIAL MEDIAL MENISCECTOMY      Meniscectomy, knee, medial    SHOULDER SURGERY      Arthroscopic Shoulder Surgery        Current Outpatient Medications:     atorvastatin (LIPITOR) 40 MG tablet, TAKE 1 TABLET BY MOUTH DAILY, Disp: 90 tablet, Rfl: 0    aspirin 81 MG EC tablet, Take 1 tablet  "by mouth Daily. (Patient not taking: Reported on 6/28/2024), Disp: 90 tablet, Rfl: 6    OBJECTIVE  Vital Signs:   /63 (BP Location: Right arm, Patient Position: Sitting, Cuff Size: Adult)   Pulse 66   Temp 98 °F (36.7 °C) (Oral)   Resp 18   Ht 167.6 cm (66\")   Wt 85.7 kg (189 lb)   SpO2 94%   BMI 30.51 kg/m²    Estimated body mass index is 30.51 kg/m² as calculated from the following:    Height as of this encounter: 167.6 cm (66\").    Weight as of this encounter: 85.7 kg (189 lb).     Wt Readings from Last 3 Encounters:   06/28/24 85.7 kg (189 lb)   05/30/24 84 kg (185 lb 3.2 oz)   06/17/24 83.9 kg (185 lb)     BP Readings from Last 3 Encounters:   06/28/24 119/63   05/30/24 121/67   05/24/24 128/70       Physical Exam  Vitals reviewed.   Constitutional:       General: He is not in acute distress.  HENT:      Head: Normocephalic and atraumatic.   Eyes:      Conjunctiva/sclera: Conjunctivae normal.   Cardiovascular:      Rate and Rhythm: Normal rate and regular rhythm.      Heart sounds: Normal heart sounds.   Pulmonary:      Effort: Pulmonary effort is normal.      Breath sounds: Normal breath sounds.   Skin:     General: Skin is warm and dry.   Neurological:      Mental Status: He is alert.      Motor: Weakness present.      Coordination: Coordination abnormal.   Psychiatric:         Mood and Affect: Mood normal.         Behavior: Behavior normal.         Cognition and Memory: Memory is impaired.         Judgment: Judgment normal.          Result Review    Common labs          4/25/2024    07:56 5/20/2024    16:10   Common Labs   Glucose  135    BUN  35    Creatinine  1.10    Sodium  140    Potassium  5.0    Chloride  103    Calcium  9.5    Albumin 4.5  4.1    Total Bilirubin 0.4  0.6    Alkaline Phosphatase 46  49    AST (SGOT) 16  90    ALT (SGPT) 20  132    WBC  10.55    Hemoglobin  14.0    Hematocrit  41.2    Platelets  175    Total Cholesterol 148     Triglycerides 83     HDL Cholesterol 40   "   LDL Cholesterol  92         MRI Brain With & Without Contrast    Result Date: 6/18/2024  Impression: Moderate typical age-related changes are present. There is also an area of signal abnormality within the right dorsal thalamus favoring developmental venous anomaly, a benign anatomic variant. No definite acute ischemia, hemorrhage, mass or mass effect. Electronically Signed: Vincent De Los Santos MD  6/18/2024 9:28 AM EDT  Workstation ID: [id number]    XR Chest 1 View    Result Date: 5/20/2024  No radiographic findings of acute cardiopulmonary abnormality.   Electronically Signed By-Bon Cha MD On:5/20/2024 4:40 PM      CT Head Without Contrast    Result Date: 5/20/2024  Impression:  1. No acute intracranial abnormality.    Electronically Signed By-Trenton Galarza MD On:5/20/2024 4:35 PM          The above data has been reviewed by DARA Krishna 06/28/2024 11:24 EDT.          Patient Care Team:  Summer Rodney APRN as PCP - General (Nurse Practitioner)            ASSESSMENT & PLAN    Diagnoses and all orders for this visit:    1. Scalp laceration, subsequent encounter (Primary)  Comments:  Completely healed    2. Recent head injury, subsequent encounter  Comments:  Still having some lingering confusion and balance issues, follow-up with neurology as scheduled    3. Confusion  Comments:  Somewhat improving, follow-up with neurology as scheduled         Tobacco Use: Medium Risk (6/28/2024)    Patient History     Smoking Tobacco Use: Former     Smokeless Tobacco Use: Never     Passive Exposure: Not on file       Follow Up     Return in about 6 months (around 12/28/2024) for Next scheduled follow up.      Patient was given instructions and counseling regarding his condition or for health maintenance advice. Please see specific information pulled into the AVS if appropriate.   I have reviewed information obtained and documented by others and I have confirmed the accuracy of this documented note.    Summer Rodney  APRN

## 2024-07-09 ENCOUNTER — LAB (OUTPATIENT)
Dept: LAB | Facility: HOSPITAL | Age: 79
End: 2024-07-09
Payer: MEDICARE

## 2024-07-09 ENCOUNTER — TELEPHONE (OUTPATIENT)
Dept: CARDIOLOGY | Facility: CLINIC | Age: 79
End: 2024-07-09
Payer: MEDICARE

## 2024-07-09 DIAGNOSIS — R55 SYNCOPE AND COLLAPSE: ICD-10-CM

## 2024-07-09 DIAGNOSIS — Z00.00 MEDICARE ANNUAL WELLNESS VISIT, SUBSEQUENT: ICD-10-CM

## 2024-07-09 DIAGNOSIS — I25.10 CORONARY ARTERY DISEASE INVOLVING NATIVE CORONARY ARTERY OF NATIVE HEART WITHOUT ANGINA PECTORIS: ICD-10-CM

## 2024-07-09 DIAGNOSIS — R55 SYNCOPE AND COLLAPSE: Primary | ICD-10-CM

## 2024-07-09 DIAGNOSIS — Z13.29 SCREENING FOR THYROID DISORDER: ICD-10-CM

## 2024-07-09 LAB
ALBUMIN SERPL-MCNC: 4.1 G/DL (ref 3.5–5.2)
ALBUMIN/GLOB SERPL: 1.3 G/DL
ALP SERPL-CCNC: 48 U/L (ref 39–117)
ALT SERPL W P-5'-P-CCNC: 15 U/L (ref 1–41)
ANION GAP SERPL CALCULATED.3IONS-SCNC: 11.9 MMOL/L (ref 5–15)
AST SERPL-CCNC: 20 U/L (ref 1–40)
BASOPHILS # BLD AUTO: 0.06 10*3/MM3 (ref 0–0.2)
BASOPHILS NFR BLD AUTO: 0.6 % (ref 0–1.5)
BILIRUB SERPL-MCNC: 0.2 MG/DL (ref 0–1.2)
BUN SERPL-MCNC: 18 MG/DL (ref 8–23)
BUN/CREAT SERPL: 20.5 (ref 7–25)
CALCIUM SPEC-SCNC: 9.1 MG/DL (ref 8.6–10.5)
CHLORIDE SERPL-SCNC: 105 MMOL/L (ref 98–107)
CHOLEST SERPL-MCNC: 217 MG/DL (ref 0–200)
CO2 SERPL-SCNC: 21.1 MMOL/L (ref 22–29)
CREAT SERPL-MCNC: 0.88 MG/DL (ref 0.76–1.27)
DEPRECATED RDW RBC AUTO: 40.7 FL (ref 37–54)
EGFRCR SERPLBLD CKD-EPI 2021: 86.9 ML/MIN/1.73
EOSINOPHIL # BLD AUTO: 0.47 10*3/MM3 (ref 0–0.4)
EOSINOPHIL NFR BLD AUTO: 4.5 % (ref 0.3–6.2)
ERYTHROCYTE [DISTWIDTH] IN BLOOD BY AUTOMATED COUNT: 12.6 % (ref 12.3–15.4)
GLOBULIN UR ELPH-MCNC: 3.1 GM/DL
GLUCOSE SERPL-MCNC: 84 MG/DL (ref 65–99)
HCT VFR BLD AUTO: 41.2 % (ref 37.5–51)
HDLC SERPL-MCNC: 32 MG/DL (ref 40–60)
HGB BLD-MCNC: 13.7 G/DL (ref 13–17.7)
IMM GRANULOCYTES # BLD AUTO: 0.03 10*3/MM3 (ref 0–0.05)
IMM GRANULOCYTES NFR BLD AUTO: 0.3 % (ref 0–0.5)
LDLC SERPL CALC-MCNC: 136 MG/DL (ref 0–100)
LDLC/HDLC SERPL: 4.09 {RATIO}
LYMPHOCYTES # BLD AUTO: 4.5 10*3/MM3 (ref 0.7–3.1)
LYMPHOCYTES NFR BLD AUTO: 43.2 % (ref 19.6–45.3)
MAGNESIUM SERPL-MCNC: 1.9 MG/DL (ref 1.6–2.4)
MCH RBC QN AUTO: 30.3 PG (ref 26.6–33)
MCHC RBC AUTO-ENTMCNC: 33.3 G/DL (ref 31.5–35.7)
MCV RBC AUTO: 91.2 FL (ref 79–97)
MONOCYTES # BLD AUTO: 0.72 10*3/MM3 (ref 0.1–0.9)
MONOCYTES NFR BLD AUTO: 6.9 % (ref 5–12)
NEUTROPHILS NFR BLD AUTO: 4.63 10*3/MM3 (ref 1.7–7)
NEUTROPHILS NFR BLD AUTO: 44.5 % (ref 42.7–76)
NRBC BLD AUTO-RTO: 0 /100 WBC (ref 0–0.2)
PLATELET # BLD AUTO: 213 10*3/MM3 (ref 140–450)
PMV BLD AUTO: 9.4 FL (ref 6–12)
POTASSIUM SERPL-SCNC: 4.3 MMOL/L (ref 3.5–5.2)
PROT SERPL-MCNC: 7.2 G/DL (ref 6–8.5)
RBC # BLD AUTO: 4.52 10*6/MM3 (ref 4.14–5.8)
SODIUM SERPL-SCNC: 138 MMOL/L (ref 136–145)
T4 FREE SERPL-MCNC: 0.73 NG/DL (ref 0.92–1.68)
TRIGL SERPL-MCNC: 271 MG/DL (ref 0–150)
TSH SERPL DL<=0.05 MIU/L-ACNC: 7.72 UIU/ML (ref 0.27–4.2)
VLDLC SERPL-MCNC: 49 MG/DL (ref 5–40)
WBC NRBC COR # BLD AUTO: 10.41 10*3/MM3 (ref 3.4–10.8)

## 2024-07-09 PROCEDURE — 80053 COMPREHEN METABOLIC PANEL: CPT

## 2024-07-09 PROCEDURE — 84443 ASSAY THYROID STIM HORMONE: CPT

## 2024-07-09 PROCEDURE — 83735 ASSAY OF MAGNESIUM: CPT

## 2024-07-09 PROCEDURE — 85025 COMPLETE CBC W/AUTO DIFF WBC: CPT

## 2024-07-09 PROCEDURE — 36415 COLL VENOUS BLD VENIPUNCTURE: CPT

## 2024-07-09 PROCEDURE — 80061 LIPID PANEL: CPT

## 2024-07-09 PROCEDURE — 84439 ASSAY OF FREE THYROXINE: CPT

## 2024-07-09 NOTE — TELEPHONE ENCOUNTER
STEFANO patient wife. Wife states that back in May patient fell and not sure why. Wife states yesterday when he was walking dog patient passed out. Paitent unsure what happened all remembers is waking up to dog licking him. Patient refused to go to ER.     Advised I would return call with recommendations

## 2024-07-10 DIAGNOSIS — E78.2 MIXED HYPERLIPIDEMIA: ICD-10-CM

## 2024-07-10 DIAGNOSIS — E03.9 HYPOTHYROIDISM, UNSPECIFIED TYPE: Primary | ICD-10-CM

## 2024-07-10 DIAGNOSIS — E03.9 HYPOTHYROIDISM, UNSPECIFIED TYPE: ICD-10-CM

## 2024-07-10 RX ORDER — ATORVASTATIN CALCIUM 80 MG/1
80 TABLET, FILM COATED ORAL DAILY
Qty: 60 TABLET | Refills: 0 | Status: SHIPPED | OUTPATIENT
Start: 2024-07-10

## 2024-07-10 RX ORDER — LEVOTHYROXINE SODIUM 0.03 MG/1
25 TABLET ORAL
Qty: 60 TABLET | Refills: 0 | Status: SHIPPED | OUTPATIENT
Start: 2024-07-10

## 2024-07-10 RX ORDER — LEVOTHYROXINE SODIUM 0.03 MG/1
25 TABLET ORAL
Qty: 90 TABLET | OUTPATIENT
Start: 2024-07-10

## 2024-07-10 RX ORDER — ATORVASTATIN CALCIUM 80 MG/1
80 TABLET, FILM COATED ORAL DAILY
Qty: 90 TABLET | OUTPATIENT
Start: 2024-07-10

## 2024-07-15 RX ORDER — ATORVASTATIN CALCIUM 40 MG/1
40 TABLET, FILM COATED ORAL DAILY
Qty: 90 TABLET | Refills: 0 | OUTPATIENT
Start: 2024-07-15

## 2024-07-16 ENCOUNTER — OFFICE VISIT (OUTPATIENT)
Dept: NEUROLOGY | Facility: CLINIC | Age: 79
End: 2024-07-16
Payer: MEDICARE

## 2024-07-16 VITALS
HEART RATE: 62 BPM | BODY MASS INDEX: 30.53 KG/M2 | HEIGHT: 66 IN | WEIGHT: 190 LBS | DIASTOLIC BLOOD PRESSURE: 68 MMHG | SYSTOLIC BLOOD PRESSURE: 132 MMHG

## 2024-07-16 DIAGNOSIS — R55 SYNCOPE AND COLLAPSE: Primary | ICD-10-CM

## 2024-07-16 DIAGNOSIS — R41.3 MEMORY LOSS: ICD-10-CM

## 2024-07-16 DIAGNOSIS — R44.3 HALLUCINATIONS: ICD-10-CM

## 2024-07-16 DIAGNOSIS — R00.2 HEART PALPITATIONS: ICD-10-CM

## 2024-07-16 RX ORDER — ASPIRIN 325 MG
325 TABLET ORAL DAILY
COMMUNITY

## 2024-07-16 NOTE — PROGRESS NOTES
[facility]         Date of Visit: 2024    Name: Michael Garcia    :  1945    PCP: Summer Rodney APRN    Visit Type: an initial evaluation         Subjective     Patient ID: Michael is a 78 y.o. male.         History of Present Illness  I had the pleasure of seeing your patient today.  As you may know he is an 78-year-old male here today for initial evaluation for complaints of 2 recent falls with head injury, confusion, and hallucinations.  He was referred by his primary care nurse practitioner.    History:    Patient does have history of coronary artery disease with previous CABG, history of atrial fibrillation status post ablation procedure, hyperlipidemia, hypothyroid.      Patient states that on 2024 he experienced a fall.  The ER records stated that he reported dizziness when he had been outside where he fell hitting the back of his head with a significant laceration to his occiput.  He did go to the emergency room at [facility] due to the head laceration as well as some nausea and vomiting.  CT of the head done at that time came back within normal limits.  Patient did receive treatment for the laceration.  He was able to ambulate and answer questions appropriately so they ended up discharging him with instructions to follow-up with any worsening symptoms.  Patient states that a couple of weeks post discharge she began experiencing symptoms of confusion and hallucinations.  He states that on several occasions he saw little people on bicycles riding up the walls or saw someone in his backyard that was not there.      Patient states he ended up following up with his primary care for follow-up after hospitalization.  She did end up ordering an MRI of the brain.  MRI completed on  revealed evidence for microvascular changes and a possible left thalamus neck venous abnormality with no other significant abnormal findings.    Patient states he experienced a  second fall approximately 3 weeks ago which was after his MRI.  He states that he had been out walking the dogs when he experienced a loss of consciousness that was not preceded by any symptoms.  He woke up on the ground he had again hit the back of his head.  He did not end up going to the emergency room for evaluation.    Current:    Patient states that he continues to have some symptoms of memory loss.  His wife notices this more than he does.  She states that he will tell her things that she does not think are true.  He has not had any additional visual hallucinations.  He denies headaches.  He does report fairly consistent dizziness and lightheadedness.  He also reports noticing that his heart rate seems to skip beats or beating irregularly.  He has tried calling to get in with his cardiologist however has been unable to get an appointment yet.  He did see them in May just prior to the initial fall.  He denies any history of seizure-like episodes.  No family history of seizure.  No other new neurological complaints at today's visit.  Patient has decided on his own to refrain from driving.        The following portions of the patient's history were reviewed and updated as appropriate: allergies, current medications, past family history, past medical history, past social history, past surgical history, and problem list.                 Review of Systems   Constitutional:  Negative for activity change, appetite change, fatigue and unexpected weight change.   HENT:  Positive for hearing loss. Negative for tinnitus, trouble swallowing and voice change.    Eyes:  Negative for photophobia, pain and visual disturbance.   Respiratory:  Negative for chest tightness and shortness of breath.    Cardiovascular:  Negative for palpitations.   Gastrointestinal:  Negative for nausea and vomiting.   Musculoskeletal:  Negative for back pain, gait problem and neck pain.   Neurological:  Positive for dizziness, syncope and  "light-headedness. Negative for tremors, seizures, facial asymmetry, speech difficulty, weakness, numbness and headaches.   Psychiatric/Behavioral:  Positive for confusion and hallucinations. Negative for sleep disturbance.             Current Medications:    Current Outpatient Medications   Medication Instructions    aspirin 325 mg, Oral, Daily    atorvastatin (LIPITOR) 80 mg, Oral, Daily    levothyroxine (SYNTHROID, LEVOTHROID) 25 mcg, Oral, Every Early Morning          /68   Pulse 62   Ht 167.6 cm (65.98\")   Wt 86.2 kg (190 lb)   BMI 30.68 kg/m²                Objective     Neurological Exam  Mental Status  Awake, alert and oriented to person, place and time. Speech is normal. Language is fluent with no aphasia.    Cranial Nerves  CN II: Visual fields full to confrontation.  CN III, IV, VI: Extraocular movements intact bilaterally. Normal lids and orbits bilaterally. Pupils equal round and reactive to light bilaterally.  CN V: Facial sensation is normal.  CN VII: Full and symmetric facial movement.  CN IX, X: Palate elevates symmetrically  CN XI: Shoulder shrug strength is normal.  CN XII: Tongue midline without atrophy or fasciculations.    Motor  Normal muscle bulk throughout. Normal muscle tone. No abnormal involuntary movements. Strength is 5/5 throughout all four extremities.    Sensory  Sensation is intact to light touch, pinprick, vibration and proprioception in all four extremities.    Reflexes  Deep tendon reflexes are 2+ and symmetric in all four extremities.    Coordination    Finger-to-nose, rapid alternating movements and heel-to-shin normal bilaterally without dysmetria.    Gait  Normal casual, toe, heel and tandem gait.      Physical Exam  Constitutional:       Appearance: Normal appearance. He is normal weight.   HENT:      Head: Normocephalic.   Eyes:      General: Lids are normal.      Extraocular Movements: Extraocular movements intact.      Pupils: Pupils are equal, round, and " reactive to light.   Pulmonary:      Effort: Pulmonary effort is normal.   Musculoskeletal:         General: Normal range of motion.   Skin:     General: Skin is warm.   Neurological:      Motor: Motor strength is normal.     Coordination: Coordination is intact.      Deep Tendon Reflexes: Reflexes are normal and symmetric.   Psychiatric:         Mood and Affect: Mood normal.         Speech: Speech normal.         Behavior: Behavior normal.         Thought Content: Thought content normal.                     Assessment & Plan     Diagnoses and all orders for this visit:    1. Syncope and collapse (Primary)  -     EEG EXTENDED MONITORING  MIN; Future  -     Holter Monitor - 72 Hour Up To 15 Days; Future    2. Hallucinations  -     EEG EXTENDED MONITORING  MIN; Future    3. Memory loss  -     EEG EXTENDED MONITORING  MIN; Future    4. Heart palpitations  -     Holter Monitor - 72 Hour Up To 15 Days; Future         At this time I would like to order EEG to rule out any neurologic cause for patient's syncopal episodes.  We will consider additional workup for memory if results of this come back within normal limits.    In addition I would like to order a Holter monitor as patient was reporting some episodes of palpitations prior to the initial fall as well as continued episodes of palpitations currently.  He does have a history of atrial fibrillation status post ablation.    Follow-up after testing is complete.         Maci DIAMOND    Neurology    [facility] [place]    Phone: [phone]    7/16/2024 , 15:30 EDT

## 2024-07-17 ENCOUNTER — PATIENT ROUNDING (BHMG ONLY) (OUTPATIENT)
Dept: NEUROLOGY | Facility: CLINIC | Age: 79
End: 2024-07-17
Payer: MEDICARE

## 2024-08-06 ENCOUNTER — HOSPITAL ENCOUNTER (OUTPATIENT)
Dept: NEUROLOGY | Facility: HOSPITAL | Age: 79
Discharge: HOME | End: 2024-08-06
Admitting: NURSE PRACTITIONER
Payer: MEDICARE

## 2024-08-06 DIAGNOSIS — R55 SYNCOPE AND COLLAPSE: ICD-10-CM

## 2024-08-06 DIAGNOSIS — R41.3 MEMORY LOSS: ICD-10-CM

## 2024-08-06 DIAGNOSIS — R44.3 HALLUCINATIONS: ICD-10-CM

## 2024-08-06 PROCEDURE — 95813 EEG EXTND MNTR 61-119 MIN: CPT

## 2024-08-29 ENCOUNTER — TELEPHONE (OUTPATIENT)
Dept: CARDIOLOGY | Facility: CLINIC | Age: 79
End: 2024-08-29
Payer: MEDICARE

## 2024-08-29 DIAGNOSIS — I49.3 FREQUENT PVCS: ICD-10-CM

## 2024-08-29 DIAGNOSIS — R94.31 ABNORMAL ELECTROCARDIOGRAM (ECG) (EKG): ICD-10-CM

## 2024-08-29 DIAGNOSIS — I47.20 V-TACH: ICD-10-CM

## 2024-08-29 DIAGNOSIS — I47.10 SVT (SUPRAVENTRICULAR TACHYCARDIA): Primary | ICD-10-CM

## 2024-08-29 RX ORDER — DILTIAZEM HYDROCHLORIDE 120 MG/1
120 CAPSULE, EXTENDED RELEASE ORAL DAILY
Qty: 90 CAPSULE | Refills: 3 | Status: SHIPPED | OUTPATIENT
Start: 2024-08-29

## 2024-08-29 NOTE — TELEPHONE ENCOUNTER
Caller: CHECO LACEYMA    Relationship to patient: Emergency Contact    Best call back number: [phone]    Chief complaint: VTACH    Type of visit: FOLLOW UP    Requested date: ASAP     If rescheduling, when is the original appointment: 11-7-24     Additional notes:NEURO SUGGESTED TO BE SEEN SOONER AFTER WEARING HOLTER MONITOR.  RESULTS HAVE BEEN FORWARDED TO DR. KAUR.  IT SHOWED VTACH AND OTHER THINGS

## 2024-08-29 NOTE — TELEPHONE ENCOUNTER
----- Message from Emilee Mesa sent at 8/29/2024  2:59 PM EDT -----  Have patient obtain BMP and Mg levels today or tomorrow. Obtain stress test as well. Start diltiazem 120 mg daily. Monitor blood pressure and heart rate twice daily, bring log with him to his appointment

## 2024-08-30 ENCOUNTER — LAB (OUTPATIENT)
Dept: LAB | Facility: HOSPITAL | Age: 79
End: 2024-08-30
Payer: MEDICARE

## 2024-08-30 DIAGNOSIS — I47.20 V-TACH: ICD-10-CM

## 2024-08-30 DIAGNOSIS — E03.9 HYPOTHYROIDISM, UNSPECIFIED TYPE: ICD-10-CM

## 2024-08-30 DIAGNOSIS — I47.10 SVT (SUPRAVENTRICULAR TACHYCARDIA): ICD-10-CM

## 2024-08-30 DIAGNOSIS — E78.2 MIXED HYPERLIPIDEMIA: ICD-10-CM

## 2024-08-30 PROCEDURE — 80048 BASIC METABOLIC PNL TOTAL CA: CPT

## 2024-08-30 PROCEDURE — 84443 ASSAY THYROID STIM HORMONE: CPT

## 2024-08-30 PROCEDURE — 83735 ASSAY OF MAGNESIUM: CPT

## 2024-08-30 PROCEDURE — 36415 COLL VENOUS BLD VENIPUNCTURE: CPT

## 2024-08-30 PROCEDURE — 84439 ASSAY OF FREE THYROXINE: CPT

## 2024-08-30 PROCEDURE — 80061 LIPID PANEL: CPT

## 2024-08-31 LAB
ANION GAP SERPL CALCULATED.3IONS-SCNC: 12 MMOL/L (ref 5–15)
BUN SERPL-MCNC: 21 MG/DL (ref 8–23)
BUN/CREAT SERPL: 20 (ref 7–25)
CALCIUM SPEC-SCNC: 9.5 MG/DL (ref 8.6–10.5)
CHLORIDE SERPL-SCNC: 105 MMOL/L (ref 98–107)
CHOLEST SERPL-MCNC: 217 MG/DL (ref 0–200)
CO2 SERPL-SCNC: 24 MMOL/L (ref 22–29)
CREAT SERPL-MCNC: 1.05 MG/DL (ref 0.76–1.27)
EGFRCR SERPLBLD CKD-EPI 2021: 71.3 ML/MIN/1.73
GLUCOSE SERPL-MCNC: 97 MG/DL (ref 65–99)
HDLC SERPL-MCNC: 34 MG/DL (ref 40–60)
LDLC SERPL CALC-MCNC: 155 MG/DL (ref 0–100)
LDLC/HDLC SERPL: 4.48 {RATIO}
MAGNESIUM SERPL-MCNC: 2.5 MG/DL (ref 1.6–2.4)
POTASSIUM SERPL-SCNC: 4 MMOL/L (ref 3.5–5.2)
SODIUM SERPL-SCNC: 141 MMOL/L (ref 136–145)
T4 FREE SERPL-MCNC: 0.77 NG/DL (ref 0.92–1.68)
TRIGL SERPL-MCNC: 154 MG/DL (ref 0–150)
TSH SERPL DL<=0.05 MIU/L-ACNC: 4.55 UIU/ML (ref 0.27–4.2)
VLDLC SERPL-MCNC: 28 MG/DL (ref 5–40)

## 2024-09-03 ENCOUNTER — TELEPHONE (OUTPATIENT)
Dept: CARDIOLOGY | Facility: CLINIC | Age: 79
End: 2024-09-03
Payer: MEDICARE

## 2024-09-03 DIAGNOSIS — E78.2 HYPERLIPEMIA, MIXED: ICD-10-CM

## 2024-09-03 DIAGNOSIS — E83.41 HYPERMAGNESEMIA: Primary | ICD-10-CM

## 2024-09-03 DIAGNOSIS — E03.9 HYPOTHYROIDISM, UNSPECIFIED TYPE: ICD-10-CM

## 2024-09-03 RX ORDER — LEVOTHYROXINE SODIUM 50 UG/1
50 TABLET ORAL
Qty: 60 TABLET | Refills: 0 | Status: SHIPPED | OUTPATIENT
Start: 2024-09-03 | End: 2024-09-03

## 2024-09-03 RX ORDER — LEVOTHYROXINE SODIUM 50 UG/1
50 TABLET ORAL
Qty: 90 TABLET | Refills: 0 | Status: SHIPPED | OUTPATIENT
Start: 2024-09-03

## 2024-09-03 NOTE — TELEPHONE ENCOUNTER
SW patient wife. Wife denies any OTC magnesium or laxatives. Wife states he does take a multivitamin a day and has 50 mg.   Wife states patient was not fasting at time of blood work.

## 2024-09-03 NOTE — TELEPHONE ENCOUNTER
----- Message from Emilee Mesa sent at 9/3/2024  8:16 AM EDT -----  Magnesium is elevated, find out if he has been taking over the counter magnesium or laxatives  Lipid panel is only slightly improved from last month, find out if compliant with Lipitor  Renal function and electrolytes are in normal range

## 2024-09-05 ENCOUNTER — OFFICE VISIT (OUTPATIENT)
Dept: CARDIOLOGY | Facility: CLINIC | Age: 79
End: 2024-09-05
Payer: MEDICARE

## 2024-09-05 VITALS
DIASTOLIC BLOOD PRESSURE: 69 MMHG | HEIGHT: 66 IN | WEIGHT: 191.4 LBS | HEART RATE: 64 BPM | SYSTOLIC BLOOD PRESSURE: 133 MMHG | BODY MASS INDEX: 30.76 KG/M2

## 2024-09-05 DIAGNOSIS — I47.10 PAROXYSMAL SVT (SUPRAVENTRICULAR TACHYCARDIA): Primary | ICD-10-CM

## 2024-09-05 DIAGNOSIS — I49.3 FREQUENT PVCS: ICD-10-CM

## 2024-09-05 PROBLEM — E78.5 HYPERLIPIDEMIA LDL GOAL <70: Status: ACTIVE | Noted: 2024-09-05

## 2024-09-05 PROCEDURE — 99214 OFFICE O/P EST MOD 30 MIN: CPT | Performed by: NURSE PRACTITIONER

## 2024-09-05 NOTE — PROGRESS NOTES
Chief Complaint  Follow-up and Coronary Artery Disease    Subjective            History of Present Illness  Michael Garcia is an 79-year-old male patient who presents to the office today for follow-up regarding diagnostic testing results.  A telephone call was made to our office on 7/9/2024 with report of the patient passing out while he was walking his dog the day prior.  At that time he refused to go to the emergency room for evaluation so labs were ordered to evaluate renal function and electrolytes.  He was also evaluated by neurology who ordered an EEG and Holter monitoring.  EEG was normal.  The Holter monitor revealed several episodes of ventricular tachycardia, SVT, and ventricular ectopies.  Labs also revealed mildly elevated magnesium level.  On 8/29/2024 he was ordered to have stress test and diltiazem was initiated.  Today he admits that stress test is currently scheduled but has not been completed yet.  He has been compliant with taking aspirin daily and diltiazem.  He has been feeling an increased amount of fatigue and palpitations in his chest.    PMH  Past Medical History:   Diagnosis Date    Atrial fibrillation     Bucket-handle tear of medial meniscus of left knee as current injury 05/15/2018    Subsequent encounter    Coronary artery disease     Difficulty walking     From fall    Head injury 5 200 2024    Fall    Hyperlipidemia     Memory loss     Osteoarthritis of AC (acromioclavicular) joint 06/11/2015    Palpitations     Peripheral tear of medial meniscus of left knee as current injury 05/03/2018    Subsequent encouter    Rotator cuff tear 03/19/2015    SLAP tear of shoulder 03/19/2015    Vision loss     From fall         ALLERGY  No Known Allergies       SURGICALHX  Past Surgical History:   Procedure Laterality Date    CARDIAC ABLATION      CARDIAC CATHETERIZATION      CARDIAC SURGERY      Heart Bypass    CORONARY ARTERY BYPASS GRAFT      KNEE ARTHROSCOPY W/ PARTIAL MEDIAL MENISCECTOMY       "Meniscectomy, knee, medial    SHOULDER SURGERY      Arthroscopic Shoulder Surgery    VASCULAR SURGERY            SOC  Social History     Socioeconomic History    Marital status:    Tobacco Use    Smoking status: Former     Current packs/day: 0.00     Average packs/day: 2.0 packs/day for 13.0 years (26.0 ttl pk-yrs)     Types: Cigarettes     Start date: 1957     Quit date: 1970     Years since quittin.7    Smokeless tobacco: Never    Tobacco comments:     Dont remember   Vaping Use    Vaping status: Never Used   Substance and Sexual Activity    Alcohol use: Not Currently    Drug use: Never    Sexual activity: Not Currently     Partners: Female         FAMHX  Family History   Problem Relation Age of Onset    Diabetes Mother     Stroke Father           MEDSIGONLY  Current Outpatient Medications on File Prior to Visit   Medication Sig    aspirin 325 MG tablet Take 1 tablet by mouth Daily.    dilTIAZem XR (DILACOR XR) 120 MG 24 hr capsule Take 1 capsule by mouth Daily.    levothyroxine (SYNTHROID, LEVOTHROID) 50 MCG tablet TAKE 1 TABLET BY MOUTH EVERY MORNING    atorvastatin (LIPITOR) 80 MG tablet Take 1 tablet by mouth Daily. (Patient not taking: Reported on 2024)     No current facility-administered medications on file prior to visit.         Objective   /69   Pulse 64   Ht 167.6 cm (65.98\")   Wt 86.8 kg (191 lb 6.4 oz)   BMI 30.91 kg/m²       Physical Exam  HENT:      Head: Normocephalic.   Neck:      Vascular: No carotid bruit.   Cardiovascular:      Rate and Rhythm: Normal rate and regular rhythm.      Pulses: Normal pulses.      Heart sounds: Normal heart sounds. No murmur heard.  Pulmonary:      Effort: Pulmonary effort is normal.      Breath sounds: Normal breath sounds.   Musculoskeletal:      Cervical back: Neck supple.      Right lower leg: No edema.      Left lower leg: No edema.   Skin:     General: Skin is dry.   Neurological:      Mental Status: He is alert and oriented to " "person, place, and time.   Psychiatric:         Behavior: Behavior normal.       Result Review :   The following data was reviewed by: DARA Alfonso on 09/05/2024:  No results found for: \"PROBNP\"  CMP          8/30/2024    12:38   CMP   Glucose 97    BUN 21    Creatinine 1.05    EGFR 71.3    Sodium 141    Potassium 4.0    Chloride 105    Calcium 9.5    Total Protein    Albumin    Globulin    Total Bilirubin    Alkaline Phosphatase    AST (SGOT)    ALT (SGPT)    Albumin/Globulin Ratio    BUN/Creatinine Ratio 20.0    Anion Gap 12.0      CBC w/diff          7/9/2024    13:59   CBC w/Diff   WBC 10.41    RBC 4.52    Hemoglobin 13.7    Hematocrit 41.2    MCV 91.2    MCH 30.3    MCHC 33.3    RDW 12.6    Platelets 213    Neutrophil Rel % 44.5    Immature Granulocyte Rel % 0.3    Lymphocyte Rel % 43.2    Monocyte Rel % 6.9    Eosinophil Rel % 4.5    Basophil Rel % 0.6       Lab Results   Component Value Date    TSH 4.550 (H) 08/30/2024      Lab Results   Component Value Date    FREET4 0.77 (L) 08/30/2024      No results found for: \"DDIMERQUANT\"  Magnesium   Date Value Ref Range Status   08/30/2024 2.5 (H) 1.6 - 2.4 mg/dL Final      No results found for: \"DIGOXIN\"   Lab Results   Component Value Date    TROPONINT 16 05/20/2024 8/30/2024    12:38   Lipid Panel   Total Cholesterol 217    Triglycerides 154    HDL Cholesterol 34    VLDL Cholesterol 28    LDL Cholesterol  155    LDL/HDL Ratio 4.48             Assessment and Plan    Diagnoses and all orders for this visit:    1. Paroxysmal SVT (supraventricular tachycardia) (Primary)  Symptomatically stable at this time, rate controlled, continue diltiazem 120 mg daily.  He is going to avoid foods high in magnesium content.  Labs are already ordered to be rechecked to assess electrolytes and renal function.  I am going to get his stress test moved up sooner so we can assess for possible ischemic cause.    2. Frequent PVCs  Recommended avoiding caffeine intake. "  Obtain echocardiogram to assess left ventricular systolic function and valvular function.  -     Adult Transthoracic Echo Complete W/ Cont if Necessary Per Protocol; Future            Follow Up   Return in about 3 months (around 12/5/2024) for Follow up with Dr Adams.    Patient was given instructions and counseling regarding his condition or for health maintenance advice. Please see specific information pulled into the AVS if appropriate.     Michael Garcia  reports that he quit smoking about 54 years ago. His smoking use included cigarettes. He started smoking about 67 years ago. He has a 26 pack-year smoking history. He has never used smokeless tobacco.          Emilee Mesa, APRVAL  09/05/24  10:50 EDT    Dictated Utilizing Dragon Dictation

## 2024-09-06 DIAGNOSIS — E78.2 MIXED HYPERLIPIDEMIA: ICD-10-CM

## 2024-09-06 DIAGNOSIS — E03.9 HYPOTHYROIDISM, UNSPECIFIED TYPE: ICD-10-CM

## 2024-09-06 RX ORDER — ATORVASTATIN CALCIUM 80 MG/1
80 TABLET, FILM COATED ORAL DAILY
Qty: 90 TABLET | OUTPATIENT
Start: 2024-09-06

## 2024-09-06 RX ORDER — LEVOTHYROXINE SODIUM 25 UG/1
25 TABLET ORAL
Qty: 90 TABLET | OUTPATIENT
Start: 2024-09-06

## 2024-09-09 ENCOUNTER — HOSPITAL ENCOUNTER (OUTPATIENT)
Dept: NUCLEAR MEDICINE | Facility: HOSPITAL | Age: 79
Discharge: HOME | End: 2024-09-09
Payer: MEDICARE

## 2024-09-09 ENCOUNTER — TELEPHONE (OUTPATIENT)
Dept: CARDIOLOGY | Facility: CLINIC | Age: 79
End: 2024-09-09
Payer: MEDICARE

## 2024-09-09 DIAGNOSIS — I47.20 V-TACH: ICD-10-CM

## 2024-09-09 DIAGNOSIS — R94.31 ABNORMAL ELECTROCARDIOGRAM (ECG) (EKG): ICD-10-CM

## 2024-09-09 DIAGNOSIS — I49.3 FREQUENT PVCS: ICD-10-CM

## 2024-09-09 DIAGNOSIS — I47.10 SVT (SUPRAVENTRICULAR TACHYCARDIA): ICD-10-CM

## 2024-09-09 LAB
BH CV IMMEDIATE POST RECOVERY TECH DATA SYMPTOMS: NORMAL
BH CV IMMEDIATE POST TECH DATA BLOOD PRESSURE: NORMAL MMHG
BH CV IMMEDIATE POST TECH DATA HEART RATE: 90 BPM
BH CV IMMEDIATE POST TECH DATA OXYGEN SATS: 95 %
BH CV REST NUCLEAR ISOTOPE DOSE: 10 MCI
BH CV SIX MINUTE RECOVERY TECH DATA BLOOD PRESSURE: NORMAL
BH CV SIX MINUTE RECOVERY TECH DATA HEART RATE: 63 BPM
BH CV SIX MINUTE RECOVERY TECH DATA OXYGEN SATURATION: 95 %
BH CV STRESS BP STAGE 1: NORMAL
BH CV STRESS COMMENTS STAGE 1: NORMAL
BH CV STRESS DOSE REGADENOSON STAGE 1: 0.4
BH CV STRESS DURATION MIN STAGE 1: 0
BH CV STRESS DURATION SEC STAGE 1: 10
BH CV STRESS HR STAGE 1: 77
BH CV STRESS NUCLEAR ISOTOPE DOSE: 36.5 MCI
BH CV STRESS O2 STAGE 1: 94
BH CV STRESS PROTOCOL 1: NORMAL
BH CV STRESS RECOVERY BP: NORMAL MMHG
BH CV STRESS RECOVERY HR: 63 BPM
BH CV STRESS RECOVERY O2: 94 %
BH CV STRESS STAGE 1: 1
BH CV THREE MINUTE POST TECH DATA BLOOD PRESSURE: NORMAL MMHG
BH CV THREE MINUTE POST TECH DATA HEART RATE: 65 BPM
BH CV THREE MINUTE POST TECH DATA OXYGEN SATURATION: 96 %
LV EF NUC BP: 46 %
MAXIMAL PREDICTED HEART RATE: 139 BPM
PERCENT MAX PREDICTED HR: 64.75 %
STRESS BASELINE BP: NORMAL MMHG
STRESS BASELINE HR: 59 BPM
STRESS PERCENT HR: 76 %
STRESS POST ESTIMATED WORKLOAD: 2.3 METS
STRESS POST EXERCISE DUR MIN: 4 MIN
STRESS POST EXERCISE DUR SEC: 0 SEC
STRESS POST O2 SAT PEAK: 95 %
STRESS POST PEAK BP: NORMAL MMHG
STRESS POST PEAK HR: 90 BPM
STRESS TARGET HR: 118 BPM

## 2024-09-09 PROCEDURE — 93016 CV STRESS TEST SUPVJ ONLY: CPT | Performed by: NURSE PRACTITIONER

## 2024-09-09 PROCEDURE — 93018 CV STRESS TEST I&R ONLY: CPT | Performed by: SPECIALIST

## 2024-09-09 PROCEDURE — 78452 HT MUSCLE IMAGE SPECT MULT: CPT | Performed by: SPECIALIST

## 2024-09-09 PROCEDURE — A9502 TC99M TETROFOSMIN: HCPCS | Performed by: NURSE PRACTITIONER

## 2024-09-09 PROCEDURE — 78452 HT MUSCLE IMAGE SPECT MULT: CPT

## 2024-09-09 PROCEDURE — 93017 CV STRESS TEST TRACING ONLY: CPT

## 2024-09-09 PROCEDURE — 0 TECHNETIUM TETROFOSMIN KIT: Performed by: NURSE PRACTITIONER

## 2024-09-09 PROCEDURE — 25010000002 REGADENOSON 0.4 MG/5ML SOLUTION: Performed by: NURSE PRACTITIONER

## 2024-09-09 RX ORDER — REGADENOSON 0.08 MG/ML
0.4 INJECTION, SOLUTION INTRAVENOUS
Status: COMPLETED | OUTPATIENT
Start: 2024-09-09 | End: 2024-09-09

## 2024-09-09 RX ADMIN — TETROFOSMIN 1 DOSE: 1.38 INJECTION, POWDER, LYOPHILIZED, FOR SOLUTION INTRAVENOUS at 07:35

## 2024-09-09 RX ADMIN — TETROFOSMIN 1 DOSE: 1.38 INJECTION, POWDER, LYOPHILIZED, FOR SOLUTION INTRAVENOUS at 08:56

## 2024-09-09 RX ADMIN — REGADENOSON 0.4 MG: 0.08 INJECTION, SOLUTION INTRAVENOUS at 08:55

## 2024-09-09 NOTE — TELEPHONE ENCOUNTER
----- Message from Emilee Mesa sent at 9/9/2024  2:32 PM EDT -----  Stress test shows no evidence of blockage in coronary vessels, continue with echo as scheduled

## 2024-09-11 ENCOUNTER — HOSPITAL ENCOUNTER (OUTPATIENT)
Dept: CARDIOLOGY | Facility: HOSPITAL | Age: 79
Discharge: HOME | End: 2024-09-11
Admitting: NURSE PRACTITIONER
Payer: MEDICARE

## 2024-09-11 DIAGNOSIS — I47.10 PAROXYSMAL SVT (SUPRAVENTRICULAR TACHYCARDIA): ICD-10-CM

## 2024-09-11 DIAGNOSIS — I49.3 FREQUENT PVCS: ICD-10-CM

## 2024-09-11 LAB
AORTIC DIMENSIONLESS INDEX: 0.69 (DI)
ASCENDING AORTA: 2.9 CM
BH CV ECHO MEAS - ACS: 1.8 CM
BH CV ECHO MEAS - AO MAX PG: 8 MMHG
BH CV ECHO MEAS - AO MEAN PG: 4 MMHG
BH CV ECHO MEAS - AO ROOT DIAM: 3 CM
BH CV ECHO MEAS - AO V2 MAX: 141.2 CM/SEC
BH CV ECHO MEAS - AO V2 VTI: 28.3 CM
BH CV ECHO MEAS - AVA(I,D): 2.23 CM2
BH CV ECHO MEAS - EDV(CUBED): 142.2 ML
BH CV ECHO MEAS - EDV(MOD-SP2): 117 ML
BH CV ECHO MEAS - EDV(MOD-SP4): 105 ML
BH CV ECHO MEAS - EF(MOD-BP): 63.1 %
BH CV ECHO MEAS - EF(MOD-SP2): 64.8 %
BH CV ECHO MEAS - EF(MOD-SP4): 63 %
BH CV ECHO MEAS - ESV(CUBED): 37.9 ML
BH CV ECHO MEAS - ESV(MOD-SP2): 41.2 ML
BH CV ECHO MEAS - ESV(MOD-SP4): 38.8 ML
BH CV ECHO MEAS - FS: 35.6 %
BH CV ECHO MEAS - IVS/LVPW: 1.48 CM
BH CV ECHO MEAS - IVSD: 1.35 CM
BH CV ECHO MEAS - LA DIMENSION: 4.6 CM
BH CV ECHO MEAS - LAT PEAK E' VEL: 8.8 CM/SEC
BH CV ECHO MEAS - LV MASS(C)D: 231.4 GRAMS
BH CV ECHO MEAS - LV MAX PG: 2.38 MMHG
BH CV ECHO MEAS - LV MEAN PG: 1.17 MMHG
BH CV ECHO MEAS - LV V1 MAX: 77.1 CM/SEC
BH CV ECHO MEAS - LV V1 VTI: 19.5 CM
BH CV ECHO MEAS - LVIDD: 5.2 CM
BH CV ECHO MEAS - LVIDS: 3.4 CM
BH CV ECHO MEAS - LVOT AREA: 3.2 CM2
BH CV ECHO MEAS - LVOT DIAM: 2.03 CM
BH CV ECHO MEAS - LVPWD: 0.91 CM
BH CV ECHO MEAS - MED PEAK E' VEL: 5.3 CM/SEC
BH CV ECHO MEAS - MR MAX PG: 99.8 MMHG
BH CV ECHO MEAS - MR MAX VEL: 499.5 CM/SEC
BH CV ECHO MEAS - MV A MAX VEL: 54 CM/SEC
BH CV ECHO MEAS - MV DEC SLOPE: 421.8 CM/SEC2
BH CV ECHO MEAS - MV DEC TIME: 0.2 SEC
BH CV ECHO MEAS - MV E MAX VEL: 91.3 CM/SEC
BH CV ECHO MEAS - MV E/A: 1.69
BH CV ECHO MEAS - MV MAX PG: 3.5 MMHG
BH CV ECHO MEAS - MV MEAN PG: 1.24 MMHG
BH CV ECHO MEAS - MV P1/2T: 70.7 MSEC
BH CV ECHO MEAS - MV V2 VTI: 31.6 CM
BH CV ECHO MEAS - MVA(P1/2T): 3.1 CM2
BH CV ECHO MEAS - MVA(VTI): 2 CM2
BH CV ECHO MEAS - PA V2 MAX: 112.7 CM/SEC
BH CV ECHO MEAS - PULM DIAS VEL: 42.2 CM/SEC
BH CV ECHO MEAS - PULM S/D: 1.22
BH CV ECHO MEAS - PULM SYS VEL: 51.4 CM/SEC
BH CV ECHO MEAS - QP/QS: 0.7
BH CV ECHO MEAS - RAP SYSTOLE: 5 MMHG
BH CV ECHO MEAS - RV MAX PG: 0.95 MMHG
BH CV ECHO MEAS - RV V1 MAX: 48.8 CM/SEC
BH CV ECHO MEAS - RV V1 VTI: 12.2 CM
BH CV ECHO MEAS - RVDD: 3.8 CM
BH CV ECHO MEAS - RVOT DIAM: 2.15 CM
BH CV ECHO MEAS - RVSP: 48.2 MMHG
BH CV ECHO MEAS - SV(LVOT): 63.2 ML
BH CV ECHO MEAS - SV(MOD-SP2): 75.8 ML
BH CV ECHO MEAS - SV(MOD-SP4): 66.2 ML
BH CV ECHO MEAS - SV(RVOT): 44.3 ML
BH CV ECHO MEAS - TAPSE (>1.6): 1.88 CM
BH CV ECHO MEAS - TR MAX PG: 43.2 MMHG
BH CV ECHO MEAS - TR MAX VEL: 328.5 CM/SEC
BH CV ECHO MEASUREMENTS AVERAGE E/E' RATIO: 12.95
BH CV XLRA - TDI S': 9.6 CM/SEC
IVRT: 55 MS
LEFT ATRIUM VOLUME INDEX: 48.9 ML/M2

## 2024-09-11 PROCEDURE — 93306 TTE W/DOPPLER COMPLETE: CPT

## 2025-01-02 ENCOUNTER — OFFICE VISIT (OUTPATIENT)
Dept: FAMILY MEDICINE CLINIC | Facility: CLINIC | Age: 80
End: 2025-01-02
Payer: MEDICARE

## 2025-01-02 VITALS
HEART RATE: 77 BPM | HEIGHT: 66 IN | SYSTOLIC BLOOD PRESSURE: 137 MMHG | WEIGHT: 197.4 LBS | DIASTOLIC BLOOD PRESSURE: 51 MMHG | BODY MASS INDEX: 31.72 KG/M2 | OXYGEN SATURATION: 96 %

## 2025-01-02 DIAGNOSIS — R42 DIZZINESS: ICD-10-CM

## 2025-01-02 DIAGNOSIS — R26.89 BALANCE PROBLEM: ICD-10-CM

## 2025-01-02 DIAGNOSIS — E78.2 MIXED HYPERLIPIDEMIA: ICD-10-CM

## 2025-01-02 DIAGNOSIS — I47.10 PAROXYSMAL SVT (SUPRAVENTRICULAR TACHYCARDIA): ICD-10-CM

## 2025-01-02 DIAGNOSIS — R63.2 EXCESSIVE HUNGER: ICD-10-CM

## 2025-01-02 DIAGNOSIS — E03.9 HYPOTHYROIDISM, UNSPECIFIED TYPE: Primary | ICD-10-CM

## 2025-01-02 RX ORDER — LEVOTHYROXINE SODIUM 50 UG/1
50 TABLET ORAL
Qty: 90 TABLET | Refills: 0 | Status: SHIPPED | OUTPATIENT
Start: 2025-01-02

## 2025-01-02 RX ORDER — MECLIZINE HCL 12.5 MG 12.5 MG/1
12.5 TABLET ORAL 3 TIMES DAILY PRN
Qty: 30 TABLET | Refills: 0 | Status: SHIPPED | OUTPATIENT
Start: 2025-01-02

## 2025-01-02 NOTE — PROGRESS NOTES
Chief Complaint  Hypothyroidism and Dizziness    SUBJECTIVE  Michael Garcia presents to [facility]    History of Present Illness  Patient is 79-year-old male presents today for follow-up.    Hypothyroidism-patient was previously on levothyroxine 50 mcg.  Patient reports has been out for about 1 month.  Patient did not know he needed to call back in for refill.  He is due updated TSH and T4 levels.    Patient is established cardiology for management of his paroxysmal SVT and CAD.  Patient was prescribed diltiazem 120 mg.  Patient reports has been out of this for about a month as well.  Upon review of medication he should have plenty of refills at the pharmacy.  Advised to restart this and follow-up with cardiology as scheduled on 14th of January. He stopped taking his Lipitor several month ago.     Patient was seen by neurology several months ago following his head injury.  Patient reports he still has issues with feeling dizzy and his head spinning.  Patient reports occasionally off balance with walking. He also reports he constantly feels hungry. Will check labs and if glucose elevated will add on A1c.     Past Medical History:   Diagnosis Date    Atrial fibrillation     Bucket-handle tear of medial meniscus of left knee as current injury 05/15/2018    Subsequent encounter    Coronary artery disease     Difficulty walking     From fall    Head injury 5 200 2024    Fall    Hyperlipidemia     Memory loss     Osteoarthritis of AC (acromioclavicular) joint 06/11/2015    Palpitations     Peripheral tear of medial meniscus of left knee as current injury 05/03/2018    Subsequent encouter    Rotator cuff tear 03/19/2015    SLAP tear of shoulder 03/19/2015    Vision loss     From fall      Family History   Problem Relation Age of Onset    Diabetes Mother     Stroke Father       Past Surgical History:   Procedure Laterality Date    CARDIAC ABLATION      CARDIAC CATHETERIZATION       "CARDIAC SURGERY      Heart Bypass    CORONARY ARTERY BYPASS GRAFT      KNEE ARTHROSCOPY W/ PARTIAL MEDIAL MENISCECTOMY      Meniscectomy, knee, medial    SHOULDER SURGERY      Arthroscopic Shoulder Surgery    VASCULAR SURGERY          Current Outpatient Medications:     aspirin 325 MG tablet, Take 1 tablet by mouth Daily., Disp: , Rfl:     dilTIAZem XR (DILACOR XR) 120 MG 24 hr capsule, Take 1 capsule by mouth Daily., Disp: 90 capsule, Rfl: 3    levothyroxine (SYNTHROID, LEVOTHROID) 50 MCG tablet, Take 1 tablet by mouth Every Morning., Disp: 90 tablet, Rfl: 0    meclizine (ANTIVERT) 12.5 MG tablet, Take 1 tablet by mouth 3 (Three) Times a Day As Needed for Dizziness., Disp: 30 tablet, Rfl: 0    OBJECTIVE  Vital Signs:   /51 (BP Location: Left arm, Patient Position: Sitting, Cuff Size: Large Adult)   Pulse 77   Ht 167.6 cm (66\")   Wt 89.5 kg (197 lb 6.4 oz)   SpO2 96%   BMI 31.86 kg/m²    Estimated body mass index is 31.86 kg/m² as calculated from the following:    Height as of this encounter: 167.6 cm (66\").    Weight as of this encounter: 89.5 kg (197 lb 6.4 oz).     Wt Readings from Last 3 Encounters:   01/02/25 89.5 kg (197 lb 6.4 oz)   09/05/24 86.8 kg (191 lb 6.4 oz)   07/16/24 86.2 kg (190 lb)     BP Readings from Last 3 Encounters:   01/02/25 137/51   09/05/24 133/69   07/16/24 132/68       Physical Exam  Vitals reviewed.   Constitutional:       General: He is not in acute distress.     Appearance: He is not ill-appearing.   HENT:      Head: Normocephalic and atraumatic.   Eyes:      Extraocular Movements: Extraocular movements intact.      Conjunctiva/sclera: Conjunctivae normal.      Pupils: Pupils are equal, round, and reactive to light.   Cardiovascular:      Rate and Rhythm: Normal rate and regular rhythm.      Heart sounds: Normal heart sounds.   Pulmonary:      Effort: Pulmonary effort is normal.      Breath sounds: Normal breath sounds.   Musculoskeletal:      Cervical back: Normal range of " motion.   Neurological:      Mental Status: He is alert and oriented to person, place, and time.      Cranial Nerves: No facial asymmetry.      Gait: Gait normal.   Psychiatric:         Mood and Affect: Mood normal.         Behavior: Behavior normal.         Thought Content: Thought content normal.         Judgment: Judgment normal.          Result Review    Common labs          5/20/2024    16:10 7/9/2024    13:59 8/30/2024    12:38   Common Labs   Glucose 135  84  97    BUN 35  18  21    Creatinine 1.10  0.88  1.05    Sodium 140  138  141    Potassium 5.0  4.3  4.0    Chloride 103  105  105    Calcium 9.5  9.1  9.5    Albumin 4.1  4.1     Total Bilirubin 0.6  0.2     Alkaline Phosphatase 49  48     AST (SGOT) 90  20     ALT (SGPT) 132  15     WBC 10.55  10.41     Hemoglobin 14.0  13.7     Hematocrit 41.2  41.2     Platelets 175  213     Total Cholesterol  217  217    Triglycerides  271  154    HDL Cholesterol  32  34    LDL Cholesterol   136  155        No Images in the past 120 days found..      The above data has been reviewed by DARA Krishna 01/02/2025 10:37 EST.          Patient Care Team:  Summer Rodney APRN as PCP - General (Nurse Practitioner)            ASSESSMENT & PLAN    Diagnoses and all orders for this visit:    1. Hypothyroidism, unspecified type (Primary)  Comments:  restart levothyroxine 50 mcg, repeat labs  Orders:  -     levothyroxine (SYNTHROID, LEVOTHROID) 50 MCG tablet; Take 1 tablet by mouth Every Morning.  Dispense: 90 tablet; Refill: 0  -     CBC w AUTO Differential; Future    2. Mixed hyperlipidemia  Comments:  repeat lipid panel, follow up with cardio for management  Orders:  -     CBC w AUTO Differential; Future  -     Comprehensive metabolic panel; Future    3. Balance problem  Comments:  call neuro for follow up appt  Orders:  -     Comprehensive metabolic panel; Future    4. Dizziness  Comments:  trial low dose meclizine, warned of drowsiness  Orders:  -     meclizine  (ANTIVERT) 12.5 MG tablet; Take 1 tablet by mouth 3 (Three) Times a Day As Needed for Dizziness.  Dispense: 30 tablet; Refill: 0    5. Excessive hunger  Comments:  will check labs    6. Paroxysmal SVT (supraventricular tachycardia)  Comments:  restart diltiazem 120 mg, follow up with cardiology for management         Tobacco Use: Medium Risk (1/2/2025)    Patient History     Smoking Tobacco Use: Former     Smokeless Tobacco Use: Never     Passive Exposure: Past       Follow Up     Return in about 6 months (around 7/2/2025), or if symptoms worsen or fail to improve, for Next scheduled follow up.      Patient was given instructions and counseling regarding his condition or for health maintenance advice. Please see specific information pulled into the AVS if appropriate.   I have reviewed information obtained and documented by others and I have confirmed the accuracy of this documented note.    DARA Krishna

## 2025-01-11 NOTE — PROGRESS NOTES
[facility]  Cardiology progress Note    Patient Name: Michael Garcia  : 1945    CHIEF COMPLAINT  CAD        Subjective   Subjective     HISTORY OF PRESENT ILLNESS    Michael Garcia is a 79 y.o. male with history of CAD.  No chest pain.    REVIEW OF SYSTEMS    Constitutional:    No fever, no weight loss  Skin:     No rash  Otolaryngeal:    No difficulty swallowing  Cardiovascular: See HPI.  Pulmonary:    No cough, no sputum production    Personal History     Social History:    reports that he quit smoking about 55 years ago. His smoking use included cigarettes. He started smoking about 68 years ago. He has a 26 pack-year smoking history. He has been exposed to tobacco smoke. He has never used smokeless tobacco. He reports that he does not currently use alcohol. He reports that he does not use drugs.    Home Medications:  Current Outpatient Medications on File Prior to Visit   Medication Sig    aspirin 325 MG tablet Take 1 tablet by mouth Daily.    dilTIAZem XR (DILACOR XR) 120 MG 24 hr capsule Take 1 capsule by mouth Daily.    levothyroxine (SYNTHROID, LEVOTHROID) 50 MCG tablet Take 1 tablet by mouth Every Morning.    meclizine (ANTIVERT) 12.5 MG tablet Take 1 tablet by mouth 3 (Three) Times a Day As Needed for Dizziness.     No current facility-administered medications on file prior to visit.       Past Medical History:   Diagnosis Date    Atrial fibrillation     Bucket-handle tear of medial meniscus of left knee as current injury 05/15/2018    Subsequent encounter    Coronary artery disease     Difficulty walking     From fall    Head injury 5 200     Fall    Hyperlipidemia     Memory loss     Osteoarthritis of AC (acromioclavicular) joint 2015    Palpitations     Peripheral tear of medial meniscus of left knee as current injury 2018    Subsequent encouter    Rotator cuff tear 2015    SLAP tear of shoulder 2015    Vision loss     From fall       Allergies:  No  Known Allergies    Objective    Objective       Vitals:   Heart Rate:  [80] 80  BP: (128)/(79) 128/79  Body mass index is 31.54 kg/m².     PHYSICAL EXAM:    General Appearance:   well developed  well nourished  HENT:   oropharynx moist  lips not cyanotic  Neck:  thyroid not enlarged  supple  Respiratory:  no respiratory distress  normal breath sounds  no rales  Cardiovascular:  no jugular venous distention  regular rhythm  apical impulse normal  S1 normal, S2 normal  no S3, no S4   no murmur  no rub, no thrill  carotid pulses normal; no bruit  pedal pulses normal  lower extremity edema: none    Skin:   warm, dry  Psychiatric:  judgement and insight appropriate  normal mood and affect        Result Review:  I have personally reviewed the available results from  [x]  Laboratory  [x]  EKG  [x]  Cardiology  [x]  Medications  [x]  Old records  []  Other:     Procedures  Lab Results   Component Value Date    CHOL 217 (H) 08/30/2024    CHOL 217 (H) 07/09/2024    CHOL 148 04/25/2024     Lab Results   Component Value Date    TRIG 154 (H) 08/30/2024    TRIG 271 (H) 07/09/2024    TRIG 83 04/25/2024     Lab Results   Component Value Date    HDL 34 (L) 08/30/2024    HDL 32 (L) 07/09/2024    HDL 40 04/25/2024     Lab Results   Component Value Date     (H) 08/30/2024     (H) 07/09/2024    LDL 92 04/25/2024     Lab Results   Component Value Date    VLDL 28 08/30/2024    VLDL 49 (H) 07/09/2024    VLDL 16 04/25/2024     Results for orders placed during the hospital encounter of 09/11/24    Adult Transthoracic Echo Complete W/ Cont if Necessary Per Protocol    Interpretation Summary  Normal left ventricular systolic function with mild left ventricular hypertrophy.  Trace MR.  Mild TR with mild pulmonary hypertension.     Impression/Plan:   1.  Mixed hyperlipidemia: Continue Lipitor 40 mg once a day.  Monitor lipid and hepatic profile.  2.  CAD s/p CABG stable: Continue aspirin 81 mg once a day.  No chest pain.  3.   History of atrial fibrillation status post ablation stable: No palpitations.           Casey Adams MD   01/14/25   11:39 EST

## 2025-01-14 ENCOUNTER — OFFICE VISIT (OUTPATIENT)
Dept: CARDIOLOGY | Facility: CLINIC | Age: 80
End: 2025-01-14
Payer: MEDICARE

## 2025-01-14 VITALS
HEIGHT: 66 IN | SYSTOLIC BLOOD PRESSURE: 128 MMHG | BODY MASS INDEX: 31.4 KG/M2 | HEART RATE: 80 BPM | DIASTOLIC BLOOD PRESSURE: 79 MMHG | WEIGHT: 195.4 LBS

## 2025-01-14 DIAGNOSIS — I25.10 CORONARY ARTERY DISEASE INVOLVING NATIVE CORONARY ARTERY OF NATIVE HEART WITHOUT ANGINA PECTORIS: ICD-10-CM

## 2025-01-14 DIAGNOSIS — Z95.1 HX OF CABG: ICD-10-CM

## 2025-01-14 DIAGNOSIS — E78.2 HYPERLIPEMIA, MIXED: Primary | ICD-10-CM

## 2025-01-14 PROCEDURE — 1160F RVW MEDS BY RX/DR IN RCRD: CPT | Performed by: SPECIALIST

## 2025-01-14 PROCEDURE — 99214 OFFICE O/P EST MOD 30 MIN: CPT | Performed by: SPECIALIST

## 2025-01-14 PROCEDURE — 1159F MED LIST DOCD IN RCRD: CPT | Performed by: SPECIALIST

## 2025-01-14 RX ORDER — ATORVASTATIN CALCIUM 40 MG/1
40 TABLET, FILM COATED ORAL DAILY
Qty: 90 TABLET | Refills: 3 | Status: SHIPPED | OUTPATIENT
Start: 2025-01-14

## 2025-01-21 ENCOUNTER — TELEPHONE (OUTPATIENT)
Dept: NEUROLOGY | Facility: CLINIC | Age: 80
End: 2025-01-21

## 2025-01-21 NOTE — TELEPHONE ENCOUNTER
SCHEDULING REQUEST    Caller: CHECO LACEYMA    Relationship to patient: Emergency Contact; SPOUSE    Best call back number: [phone]    Chief complaint: PT'S SPOUSE CALLING UPON RECOMMENDATION OF PT'S PCP TO SCHEDULE F/U APPT W/ DARA COMER DUE TO WORSENING DIZZINESS AND MEMORY CONCERNS.    Type of visit: FOLLOW UP    If rescheduling, when is the original appointment: N/A    Requested date: NEXT AVAILABLE     Additional notes: PER [facility] REF TOOL, OFFICE SCHEDULES DARA COMER'S MEMORY-RELATED APPTS.     PLEASE REVIEW AND ADVISE.  
This has been scheduled.  
today

## 2025-02-17 ENCOUNTER — TELEPHONE (OUTPATIENT)
Dept: FAMILY MEDICINE CLINIC | Facility: CLINIC | Age: 80
End: 2025-02-17
Payer: MEDICARE

## 2025-02-21 ENCOUNTER — LAB (OUTPATIENT)
Dept: LAB | Facility: HOSPITAL | Age: 80
End: 2025-02-21
Payer: MEDICARE

## 2025-02-21 DIAGNOSIS — E03.9 HYPOTHYROIDISM, UNSPECIFIED TYPE: ICD-10-CM

## 2025-02-21 DIAGNOSIS — E78.2 MIXED HYPERLIPIDEMIA: ICD-10-CM

## 2025-02-21 DIAGNOSIS — E78.2 HYPERLIPEMIA, MIXED: ICD-10-CM

## 2025-02-21 DIAGNOSIS — R26.89 BALANCE PROBLEM: ICD-10-CM

## 2025-02-21 DIAGNOSIS — E83.41 HYPERMAGNESEMIA: ICD-10-CM

## 2025-02-21 LAB
ALBUMIN SERPL-MCNC: 3.7 G/DL (ref 3.5–5.2)
ALBUMIN/GLOB SERPL: 1 G/DL
ALP SERPL-CCNC: 39 U/L (ref 39–117)
ALT SERPL W P-5'-P-CCNC: 15 U/L (ref 1–41)
ANION GAP SERPL CALCULATED.3IONS-SCNC: 11.9 MMOL/L (ref 5–15)
AST SERPL-CCNC: 22 U/L (ref 1–40)
BASOPHILS # BLD AUTO: 0.07 10*3/MM3 (ref 0–0.2)
BASOPHILS NFR BLD AUTO: 0.8 % (ref 0–1.5)
BILIRUB SERPL-MCNC: 0.3 MG/DL (ref 0–1.2)
BUN SERPL-MCNC: 19 MG/DL (ref 8–23)
BUN/CREAT SERPL: 16.1 (ref 7–25)
CALCIUM SPEC-SCNC: 10.1 MG/DL (ref 8.6–10.5)
CHLORIDE SERPL-SCNC: 102 MMOL/L (ref 98–107)
CHOLEST SERPL-MCNC: 215 MG/DL (ref 0–200)
CO2 SERPL-SCNC: 25.1 MMOL/L (ref 22–29)
CREAT SERPL-MCNC: 1.18 MG/DL (ref 0.76–1.27)
DEPRECATED RDW RBC AUTO: 41.1 FL (ref 37–54)
EGFRCR SERPLBLD CKD-EPI 2021: 62 ML/MIN/1.73
EOSINOPHIL # BLD AUTO: 0.37 10*3/MM3 (ref 0–0.4)
EOSINOPHIL NFR BLD AUTO: 4 % (ref 0.3–6.2)
ERYTHROCYTE [DISTWIDTH] IN BLOOD BY AUTOMATED COUNT: 12.7 % (ref 12.3–15.4)
GLOBULIN UR ELPH-MCNC: 3.6 GM/DL
GLUCOSE SERPL-MCNC: 83 MG/DL (ref 65–99)
HCT VFR BLD AUTO: 41.9 % (ref 37.5–51)
HDLC SERPL-MCNC: 33 MG/DL (ref 40–60)
HGB BLD-MCNC: 14.4 G/DL (ref 13–17.7)
IMM GRANULOCYTES # BLD AUTO: 0.03 10*3/MM3 (ref 0–0.05)
IMM GRANULOCYTES NFR BLD AUTO: 0.3 % (ref 0–0.5)
LDLC SERPL CALC-MCNC: 144 MG/DL (ref 0–100)
LDLC/HDLC SERPL: 4.25 {RATIO}
LYMPHOCYTES # BLD AUTO: 3.44 10*3/MM3 (ref 0.7–3.1)
LYMPHOCYTES NFR BLD AUTO: 37.5 % (ref 19.6–45.3)
MAGNESIUM SERPL-MCNC: 1.8 MG/DL (ref 1.6–2.4)
MCH RBC QN AUTO: 30.9 PG (ref 26.6–33)
MCHC RBC AUTO-ENTMCNC: 34.4 G/DL (ref 31.5–35.7)
MCV RBC AUTO: 89.9 FL (ref 79–97)
MONOCYTES # BLD AUTO: 0.7 10*3/MM3 (ref 0.1–0.9)
MONOCYTES NFR BLD AUTO: 7.6 % (ref 5–12)
NEUTROPHILS NFR BLD AUTO: 4.56 10*3/MM3 (ref 1.7–7)
NEUTROPHILS NFR BLD AUTO: 49.8 % (ref 42.7–76)
NRBC BLD AUTO-RTO: 0 /100 WBC (ref 0–0.2)
PLATELET # BLD AUTO: 255 10*3/MM3 (ref 140–450)
PMV BLD AUTO: 10 FL (ref 6–12)
POTASSIUM SERPL-SCNC: 4.7 MMOL/L (ref 3.5–5.2)
PROT SERPL-MCNC: 7.3 G/DL (ref 6–8.5)
RBC # BLD AUTO: 4.66 10*6/MM3 (ref 4.14–5.8)
SODIUM SERPL-SCNC: 139 MMOL/L (ref 136–145)
T4 FREE SERPL-MCNC: 1.02 NG/DL (ref 0.92–1.68)
TRIGL SERPL-MCNC: 208 MG/DL (ref 0–150)
TSH SERPL DL<=0.05 MIU/L-ACNC: 4.19 UIU/ML (ref 0.27–4.2)
VLDLC SERPL-MCNC: 38 MG/DL (ref 5–40)
WBC NRBC COR # BLD AUTO: 9.17 10*3/MM3 (ref 3.4–10.8)

## 2025-02-21 PROCEDURE — 83735 ASSAY OF MAGNESIUM: CPT

## 2025-02-21 PROCEDURE — 36415 COLL VENOUS BLD VENIPUNCTURE: CPT

## 2025-02-21 PROCEDURE — 85025 COMPLETE CBC W/AUTO DIFF WBC: CPT

## 2025-02-21 PROCEDURE — 80053 COMPREHEN METABOLIC PANEL: CPT

## 2025-02-21 PROCEDURE — 84443 ASSAY THYROID STIM HORMONE: CPT

## 2025-02-21 PROCEDURE — 80061 LIPID PANEL: CPT

## 2025-02-21 PROCEDURE — 84439 ASSAY OF FREE THYROXINE: CPT

## 2025-02-25 ENCOUNTER — TELEPHONE (OUTPATIENT)
Dept: CARDIOLOGY | Facility: CLINIC | Age: 80
End: 2025-02-25
Payer: MEDICARE

## 2025-02-25 DIAGNOSIS — E78.2 HYPERLIPEMIA, MIXED: Primary | ICD-10-CM

## 2025-02-25 NOTE — TELEPHONE ENCOUNTER
STEFANO patient regarding results and recommendations. Voiced understanding.  Patient reports he has not been taking atorvastatin. Reports he will start taking consistently.

## 2025-02-25 NOTE — TELEPHONE ENCOUNTER
----- Message from Emilee Mesa sent at 2/25/2025  3:08 PM EST -----  Labs are all good except lipid panel is elevated, find out if compliant with atorvastatin

## 2025-03-05 ENCOUNTER — OFFICE VISIT (OUTPATIENT)
Dept: NEUROLOGY | Facility: CLINIC | Age: 80
End: 2025-03-05
Payer: MEDICARE

## 2025-03-05 VITALS
WEIGHT: 193 LBS | HEIGHT: 66 IN | DIASTOLIC BLOOD PRESSURE: 72 MMHG | BODY MASS INDEX: 31.02 KG/M2 | OXYGEN SATURATION: 97 % | HEART RATE: 73 BPM | SYSTOLIC BLOOD PRESSURE: 148 MMHG

## 2025-03-05 DIAGNOSIS — R44.3 HALLUCINATIONS: ICD-10-CM

## 2025-03-05 DIAGNOSIS — G31.84 MILD COGNITIVE IMPAIRMENT: Primary | ICD-10-CM

## 2025-03-05 RX ORDER — DONEPEZIL HYDROCHLORIDE 5 MG/1
5 TABLET, FILM COATED ORAL DAILY
Qty: 21 TABLET | Refills: 0 | Status: SHIPPED | OUTPATIENT
Start: 2025-03-05 | End: 2025-03-26

## 2025-03-05 RX ORDER — DONEPEZIL HYDROCHLORIDE 10 MG/1
10 TABLET, FILM COATED ORAL NIGHTLY
Qty: 30 TABLET | Refills: 2 | Status: SHIPPED | OUTPATIENT
Start: 2025-03-05 | End: 2025-06-03

## 2025-03-05 NOTE — PROGRESS NOTES
[facility]         Date of Visit: 3/5/2025    Name: Michael Garcia    :  1945    PCP: Summer Rodney APRN    Visit Type: follow-up         Subjective     Patient ID: Michael is a 79 y.o. male.         History of Present Illness  I had the pleasure of seeing your patient today.  As you may know he is an 78-year-old male here today for follow-up for complaints of 2 recent falls with head injury, confusion, and hallucinations.  He was referred by his primary care nurse practitioner.    History:    Patient does have history of coronary artery disease with previous CABG, history of atrial fibrillation status post ablation procedure, hyperlipidemia, hypothyroid.      Patient states that on 2024 he experienced a fall.  The ER records stated that he reported dizziness when he had been outside where he fell hitting the back of his head with a significant laceration to his occiput.  He did go to the emergency room at [facility] due to the head laceration as well as some nausea and vomiting.  CT of the head done at that time came back within normal limits.  Patient did receive treatment for the laceration.  He was able to ambulate and answer questions appropriately so they ended up discharging him with instructions to follow-up with any worsening symptoms.  Patient states that a couple of weeks post discharge she began experiencing symptoms of confusion and hallucinations.  He states that on several occasions he saw little people on bicycles riding up the walls or saw someone in his backyard that was not there.      Patient states he ended up following up with his primary care for follow-up after hospitalization.  She did end up ordering an MRI of the brain.  MRI completed on  revealed evidence for microvascular changes and a possible left thalamus neck venous abnormality with no other significant abnormal findings.    Patient states he experienced a second fall  approximately 3 weeks ago which was after his MRI.  He states that he had been out walking the dogs when he experienced a loss of consciousness that was not preceded by any symptoms.  He woke up on the ground he had again hit the back of his head.  He did not end up going to the emergency room for evaluation.    Current:    Patient was last seen in our office in July 2024.  At that time we did end up ordering an EEG to rule out seizure as potential cause for syncope and confusion issues.  EEG did end up coming back within normal limits.  Patient had not been in for follow-up appointment since the EEG was completed.  He is here today again for follow-up mainly for memory complaints.    Patient is accompanied by his wife today.  She states that he continues to have worsening cognitive and forgetfulness.  She states that he is having more difficulty remembering how to do things will walk into a room and cannot remember why he is there.  He does admit to having trouble remembering sometimes how to get home especially if he cannot find familiar landmarks around.  He has stopped driving in the evenings because of this.  They deny any agitation issues.  But they have not reported any recent hallucinations.  Patient's sister does have dementia as well.  He is not sure of the exact diagnosis.  No other new or worsening neurologic complaints at today's visit.    Dizziness      The following portions of the patient's history were reviewed and updated as appropriate: allergies, current medications, past family history, past medical history, past social history, past surgical history, and problem list.                 Review of Systems   Constitutional:  Negative for activity change, appetite change and unexpected weight change.   HENT:  Positive for hearing loss. Negative for tinnitus, trouble swallowing and voice change.    Eyes:  Negative for photophobia, pain and visual disturbance.   Respiratory:  Negative for chest  "tightness and shortness of breath.    Cardiovascular:  Negative for palpitations.   Musculoskeletal:  Negative for back pain and gait problem.   Neurological:  Positive for dizziness and light-headedness. Negative for tremors, seizures, syncope, facial asymmetry and speech difficulty.   Psychiatric/Behavioral:  Positive for confusion and hallucinations. Negative for sleep disturbance.             Current Medications:    Current Outpatient Medications   Medication Instructions    aspirin 325 mg, Daily    atorvastatin (LIPITOR) 40 mg, Oral, Daily    donepezil (ARICEPT) 5 mg, Oral, Daily    donepezil (ARICEPT) 10 mg, Oral, Nightly    levothyroxine (SYNTHROID, LEVOTHROID) 50 mcg, Oral, Every Early Morning    meclizine (ANTIVERT) 12.5 mg, Oral, 3 Times Daily PRN          /72   Pulse 73   Ht 167.6 cm (65.98\")   Wt 87.5 kg (193 lb)   SpO2 97%   BMI 31.17 kg/m²                Objective     Neurological Exam  Mental Status  Awake and alert. Oriented only to person, place and situation. 2/5 time. Recalls 3 of 3 objects immediately. At 15 minutes recalls 1 of 3 elements. Recalls 2 of 3 objects with prompting. Unable to copy figure. Speech is normal. Language is fluent with no aphasia. Attention and concentration are normal. MMSE score: 24.    Cranial Nerves  CN II: Visual fields full to confrontation.  CN III, IV, VI: Extraocular movements intact bilaterally. Normal lids and orbits bilaterally. Pupils equal round and reactive to light bilaterally.  CN V: Facial sensation is normal.  CN VII: Full and symmetric facial movement.  CN IX, X: Palate elevates symmetrically  CN XI: Shoulder shrug strength is normal.  CN XII: Tongue midline without atrophy or fasciculations.    Motor  Normal muscle bulk throughout. Normal muscle tone. No abnormal involuntary movements. Strength is 5/5 throughout all four extremities.    Sensory  Sensation is intact to light touch, pinprick, vibration and proprioception in all four " extremities.    Reflexes  Deep tendon reflexes are 2+ and symmetric in all four extremities.    Coordination    Finger-to-nose, rapid alternating movements and heel-to-shin normal bilaterally without dysmetria.    Gait  Normal casual, toe, heel and tandem gait.      Physical Exam  Constitutional:       General: He is awake.      Appearance: Normal appearance. He is normal weight.   HENT:      Head: Normocephalic.   Eyes:      General: Lids are normal.      Extraocular Movements: Extraocular movements intact.      Pupils: Pupils are equal, round, and reactive to light.   Pulmonary:      Effort: Pulmonary effort is normal.   Musculoskeletal:         General: Normal range of motion.   Skin:     General: Skin is warm.   Neurological:      Mental Status: He is alert.      Motor: Motor strength is normal.     Coordination: Coordination is intact.      Deep Tendon Reflexes: Reflexes are normal and symmetric.   Psychiatric:         Mood and Affect: Mood normal.         Speech: Speech normal.         Behavior: Behavior normal.         Thought Content: Thought content normal.                     Assessment & Plan     Diagnoses and all orders for this visit:    1. Mild cognitive impairment (Primary)  -     donepezil (ARICEPT) 5 MG tablet; Take 1 tablet by mouth Daily for 21 days.  Dispense: 21 tablet; Refill: 0  -     donepezil (ARICEPT) 10 MG tablet; Take 1 tablet by mouth Every Night for 90 days.  Dispense: 30 tablet; Refill: 2  -     Ambulatory Referral to Neuropsychology  -     Vitamin B12; Future  -     Folate; Future  -     Methylmalonic Acid, Serum; Future    2. Hallucinations           At this time did discuss results of EEG from previous visit.    I would like to go ahead and complete workup for memory loss including a mild cognitive impairment versus dementia type syndrome.    We will go ahead and order neuropsychological testing.    I would also like to check a B12, folate, methylmalonic acid level.  Patient is  currently taking oral supplementation so they will discontinue this and have lab draw done in [facility] in 1 week.    We will also go ahead and order blood biomarker testing for Alzheimer's disease.  We will go ahead and order precivity testing for them.    In addition we will start patient on donepezil for mild cognitive impairment 5 mg for 3 weeks and then increasing to 10 mg.  They were made aware that medication can cause nausea or diarrhea and to let us know if he is having any side effect complaints.    Follow-up in 2 months or sooner if needed.         Maci DIAMOND    Neurology    [facility] [place]    Phone: [phone]    3/5/2025 , 14:53 EST

## 2025-03-07 ENCOUNTER — TELEPHONE (OUTPATIENT)
Dept: NEUROLOGY | Facility: CLINIC | Age: 80
End: 2025-03-07
Payer: MEDICARE

## 2025-03-07 NOTE — TELEPHONE ENCOUNTER
Filled out [facility] diagnosit paperwork requisition with patient, signed pt up for patient assistance, Provider signed orders, faxed paperwork to [facility], notified patient that [facility] will reach out to them regarding shipment/scheduling of lab. Paperwork scanned into patients chart

## 2025-03-28 DIAGNOSIS — G31.84 MILD COGNITIVE IMPAIRMENT: ICD-10-CM

## 2025-03-28 RX ORDER — DONEPEZIL HYDROCHLORIDE 5 MG/1
5 TABLET, FILM COATED ORAL DAILY
Qty: 21 TABLET | Refills: 0 | OUTPATIENT
Start: 2025-03-28 | End: 2025-04-18

## 2025-03-31 DIAGNOSIS — E03.9 HYPOTHYROIDISM, UNSPECIFIED TYPE: ICD-10-CM

## 2025-04-01 ENCOUNTER — TELEPHONE (OUTPATIENT)
Dept: NEUROLOGY | Facility: CLINIC | Age: 80
End: 2025-04-01
Payer: MEDICARE

## 2025-04-01 RX ORDER — LEVOTHYROXINE SODIUM 50 UG/1
50 TABLET ORAL
Qty: 90 TABLET | Refills: 0 | Status: SHIPPED | OUTPATIENT
Start: 2025-04-01

## 2025-04-01 NOTE — TELEPHONE ENCOUNTER
Provider: DARA JOAQUIN    Caller: [facility]    Relationship to Patient:     Phone Number: [phone]    Reason for Call: [facility] RECEIVED REQUISITION FOR PT, HOWEVER DID NOT RECEIVE INS INFO INDICATED AS ATTACHED.    FAX: [phone]    When was the patient last seen: 03/05/25      PLEASE REVIEW AND ADVISE

## 2025-04-11 ENCOUNTER — OFFICE VISIT (OUTPATIENT)
Dept: NEUROLOGY | Facility: CLINIC | Age: 80
End: 2025-04-11
Payer: MEDICARE

## 2025-04-11 VITALS
HEIGHT: 66 IN | BODY MASS INDEX: 30.7 KG/M2 | HEART RATE: 72 BPM | WEIGHT: 191 LBS | DIASTOLIC BLOOD PRESSURE: 72 MMHG | SYSTOLIC BLOOD PRESSURE: 126 MMHG | OXYGEN SATURATION: 96 %

## 2025-04-11 DIAGNOSIS — G31.84 MILD COGNITIVE IMPAIRMENT: Primary | ICD-10-CM

## 2025-04-11 DIAGNOSIS — R44.3 HALLUCINATIONS: ICD-10-CM

## 2025-04-11 NOTE — PROGRESS NOTES
[facility] GROUP NEUROLOGY         Date of Visit: 2025    Name: Michael Garcia    :  1945    PCP: Summer Rodney APRN    Visit Type: follow-up         Subjective     Patient ID: Michael is a 79 y.o. male.         History of Present Illness  I had the pleasure of seeing your patient today.  As you may know he is an 79-year-old male here today for follow-up for complaints of 2 recent falls with head injury, confusion, and hallucinations.  He was referred by his primary care nurse practitioner.    History:    Patient does have history of coronary artery disease with previous CABG, history of atrial fibrillation status post ablation procedure, hyperlipidemia, hypothyroid.      Patient states that on 2024 he experienced a fall.  The ER records stated that he reported dizziness when he had been outside where he fell hitting the back of his head with a significant laceration to his occiput.  He did go to the emergency room at [facility] due to the head laceration as well as some nausea and vomiting.  CT of the head done at that time came back within normal limits.  Patient did receive treatment for the laceration.  He was able to ambulate and answer questions appropriately so they ended up discharging him with instructions to follow-up with any worsening symptoms.  Patient states that a couple of weeks post discharge she began experiencing symptoms of confusion and hallucinations.  He states that on several occasions he saw little people on bicycles riding up the walls or saw someone in his backyard that was not there.      Patient states he ended up following up with his primary care for follow-up after hospitalization.  She did end up ordering an MRI of the brain.  MRI completed on  revealed evidence for microvascular changes and a possible left thalamus neck venous abnormality with no other significant abnormal findings.    Patient states he experienced a second fall  approximately 3 weeks ago which was after his MRI.  He states that he had been out walking the dogs when he experienced a loss of consciousness that was not preceded by any symptoms.  He woke up on the ground he had again hit the back of his head.  He did not end up going to the emergency room for evaluation.    Patient did complete an EEG in August 2024 showing no epileptiform abnormalities.    He was last seen in our office in March 2025 for continued cognitive complaints.    Current:    At last appointment we did end up completing Mini-Mental status examination.  MMSE did reveal evidence for a mild cognitive impairment.  Because of this we ended up starting patient on donepezil.  We also ordered neuropsychological testing and an ATN profile to look for potential Alzheimer's disease.  Patient's ATN profile came back negative for Alzheimer's disease.  He has not yet scheduled his neuropsychological testing at [facility].  He has started on the donepezil.  He states that the first day of the medication he had some vivid dreams on the medicine however has not had any issues since that time.  He has had no stomach complaints with the medicine.  He denies any worsening cognitive symptoms.  No other new or worsening neurologic complaints at today's visit.        The following portions of the patient's history were reviewed and updated as appropriate: allergies, current medications, past family history, past medical history, past social history, past surgical history, and problem list.                 Review of Systems   Constitutional:  Negative for activity change, appetite change, fatigue and unexpected weight change.   HENT:  Negative for hearing loss, tinnitus and trouble swallowing.    Eyes:  Negative for photophobia, pain and visual disturbance.   Respiratory:  Negative for chest tightness and shortness of breath.    Cardiovascular:  Negative for palpitations.   Gastrointestinal:  Negative for nausea  "and vomiting.   Musculoskeletal:  Negative for back pain and neck pain.   Neurological:  Negative for dizziness, syncope, facial asymmetry, speech difficulty, weakness, light-headedness, numbness and headaches.   Psychiatric/Behavioral:  Positive for confusion and sleep disturbance. Negative for agitation, behavioral problems, decreased concentration and hallucinations.             Current Medications:    Current Outpatient Medications   Medication Instructions    amoxicillin-clavulanate (Augmentin) 500-125 MG per tablet 500 mg, Oral, 2 Times Daily    aspirin 325 mg, Daily    atorvastatin (LIPITOR) 40 mg, Oral, Daily    donepezil (ARICEPT) 10 mg, Oral, Nightly    levothyroxine (SYNTHROID, LEVOTHROID) 50 mcg, Oral, Every Early Morning    meclizine (ANTIVERT) 12.5 mg, Oral, 3 Times Daily PRN          /72   Pulse 72   Ht 167.6 cm (65.98\")   Wt 86.6 kg (191 lb)   SpO2 96%   BMI 30.84 kg/m²                Objective     Neurological Exam  Mental Status  Awake and alert. Oriented only to person, place and situation. 2/5 time. Recalls 3 of 3 objects immediately. At 15 minutes recalls 1 of 3 elements. Recalls 2 of 3 objects with prompting. Unable to copy figure. Speech is normal. Language is fluent with no aphasia. Attention and concentration are normal. MMSE score: 24.    Cranial Nerves  CN II: Visual fields full to confrontation.  CN III, IV, VI: Extraocular movements intact bilaterally. Normal lids and orbits bilaterally. Pupils equal round and reactive to light bilaterally.  CN V: Facial sensation is normal.  CN VII: Full and symmetric facial movement.  CN IX, X: Palate elevates symmetrically  CN XI: Shoulder shrug strength is normal.  CN XII: Tongue midline without atrophy or fasciculations.    Motor  Normal muscle bulk throughout. Normal muscle tone. No abnormal involuntary movements. Strength is 5/5 throughout all four extremities.    Sensory  Sensation is intact to light touch, pinprick, vibration and " proprioception in all four extremities.    Reflexes  Deep tendon reflexes are 2+ and symmetric in all four extremities.    Coordination    Finger-to-nose, rapid alternating movements and heel-to-shin normal bilaterally without dysmetria.    Gait  Normal casual, toe, heel and tandem gait.      Physical Exam  Constitutional:       General: He is awake.      Appearance: Normal appearance. He is normal weight.   HENT:      Head: Normocephalic.   Eyes:      General: Lids are normal.      Extraocular Movements: Extraocular movements intact.      Pupils: Pupils are equal, round, and reactive to light.   Pulmonary:      Effort: Pulmonary effort is normal.   Musculoskeletal:         General: Normal range of motion.   Skin:     General: Skin is warm.   Neurological:      Mental Status: He is alert.      Motor: Motor strength is normal.     Coordination: Coordination is intact.      Deep Tendon Reflexes: Reflexes are normal and symmetric.   Psychiatric:         Mood and Affect: Mood normal.         Speech: Speech normal.         Behavior: Behavior normal.         Thought Content: Thought content normal.                     Assessment & Plan     Diagnoses and all orders for this visit:    1. Mild cognitive impairment (Primary)    2. Hallucinations             At this time did discuss results of the ATN profile.  I would like to continue on donepezil for now for treatment of memory loss.    I did give him again the phone number for neuropsychological testing at [facility] and told them to call to schedule this appointment.    In for follow-up after neuropsych testing is complete.         Maci DIAMOND    Neurology    [facility] [place]    Phone: [phone]    4/11/2025 , 11:08 EDT

## 2025-04-25 ENCOUNTER — PATIENT ROUNDING (BHMG ONLY) (OUTPATIENT)
Dept: URGENT CARE | Facility: CLINIC | Age: 80
End: 2025-04-25
Payer: MEDICARE

## 2025-04-25 NOTE — ED NOTES
Thank you for letting us care for you in your recent visit to our urgent care center. We would love to hear about your experience with us. Was this the first time you have visited our location?    We're always looking for ways to make our patients' experiences even better. Do you have any recommendations on ways we may improve?     I appreciate you taking the time to respond. Please be on the lookout for a survey about your recent visit from [employer] via text or email. We would greatly appreciate if you could fill that out and turn it back in. We want your voice to be heard and we value your feedback.   Thank you for choosing [facility] for your healthcare needs.

## 2025-06-05 DIAGNOSIS — G31.84 MILD COGNITIVE IMPAIRMENT: ICD-10-CM

## 2025-06-05 RX ORDER — DONEPEZIL HYDROCHLORIDE 10 MG/1
10 TABLET, FILM COATED ORAL NIGHTLY
Qty: 30 TABLET | Refills: 2 | OUTPATIENT
Start: 2025-06-05

## 2025-07-01 DIAGNOSIS — E03.9 HYPOTHYROIDISM, UNSPECIFIED TYPE: ICD-10-CM

## 2025-07-01 RX ORDER — LEVOTHYROXINE SODIUM 50 UG/1
50 TABLET ORAL
Qty: 90 TABLET | Refills: 0 | Status: SHIPPED | OUTPATIENT
Start: 2025-07-01

## 2025-07-02 ENCOUNTER — OFFICE VISIT (OUTPATIENT)
Dept: FAMILY MEDICINE CLINIC | Facility: CLINIC | Age: 80
End: 2025-07-02
Payer: COMMERCIAL

## 2025-07-02 ENCOUNTER — TELEPHONE (OUTPATIENT)
Dept: FAMILY MEDICINE CLINIC | Facility: CLINIC | Age: 80
End: 2025-07-02

## 2025-07-02 VITALS
BODY MASS INDEX: 31.18 KG/M2 | SYSTOLIC BLOOD PRESSURE: 144 MMHG | OXYGEN SATURATION: 97 % | WEIGHT: 194 LBS | HEIGHT: 66 IN | HEART RATE: 67 BPM | DIASTOLIC BLOOD PRESSURE: 67 MMHG

## 2025-07-02 DIAGNOSIS — I47.10 PAROXYSMAL SVT (SUPRAVENTRICULAR TACHYCARDIA): Primary | ICD-10-CM

## 2025-07-02 DIAGNOSIS — Z00.00 MEDICARE ANNUAL WELLNESS VISIT, SUBSEQUENT: Primary | ICD-10-CM

## 2025-07-02 DIAGNOSIS — E78.2 MIXED HYPERLIPIDEMIA: ICD-10-CM

## 2025-07-02 DIAGNOSIS — I47.10 PAROXYSMAL SVT (SUPRAVENTRICULAR TACHYCARDIA): ICD-10-CM

## 2025-07-02 DIAGNOSIS — I25.10 CORONARY ARTERY DISEASE INVOLVING NATIVE CORONARY ARTERY OF NATIVE HEART WITHOUT ANGINA PECTORIS: ICD-10-CM

## 2025-07-02 DIAGNOSIS — E03.9 HYPOTHYROIDISM, UNSPECIFIED TYPE: ICD-10-CM

## 2025-07-02 RX ORDER — DILTIAZEM HYDROCHLORIDE 120 MG/1
120 TABLET, FILM COATED ORAL 4 TIMES DAILY
COMMUNITY
End: 2025-07-02

## 2025-07-02 RX ORDER — DILTIAZEM HYDROCHLORIDE 120 MG/1
120 CAPSULE, EXTENDED RELEASE ORAL DAILY
COMMUNITY
End: 2025-07-03 | Stop reason: SDUPTHER

## 2025-07-02 NOTE — ASSESSMENT & PLAN NOTE
Continue to see cardio for management, unsure if he is taking his aspirin or Lipitor, will call office to go over meds he is currently taking, will defer to cardio for treatment management    Orders:    Comprehensive Metabolic Panel; Future    CBC & Differential; Future    Lipid Panel; Future

## 2025-07-02 NOTE — PROGRESS NOTES
Subjective   The ABCs of the Annual Wellness Visit  Medicare Wellness Visit      Michael Garcia is a 79 y.o. patient who presents for a Medicare Wellness Visit.    The following portions of the patient's history were reviewed and   updated as appropriate: allergies, current medications, past family history, past medical history, past social history, past surgical history, and problem list.    Compared to one year ago, the patient's physical   health is better.  Compared to one year ago, the patient's mental   health is the same.    Recent Hospitalizations:  He was not admitted to the hospital during the last year.     Current Medical Providers:  Patient Care Team:  Summer Rodney APRN as PCP - General (Nurse Practitioner)  Casey Adams MD as Consulting Physician (Cardiology)  Emilee Mesa APRN as Nurse Practitioner (Nurse Practitioner)  Maci Monroy APRN as Nurse Practitioner (Nurse Practitioner)    Outpatient Medications Prior to Visit   Medication Sig Dispense Refill    dilTIAZem (CARDIZEM) 120 MG tablet Take 1 tablet by mouth 4 (Four) Times a Day.      aspirin 325 MG tablet Take 1 tablet by mouth Daily. (Patient not taking: Reported on 7/2/2025)      atorvastatin (LIPITOR) 40 MG tablet Take 1 tablet by mouth Daily. 90 tablet 3    dilTIAZem XR (Dilt-XR) 120 MG 24 hr capsule Take 1 capsule by mouth Daily. (Patient not taking: Reported on 7/2/2025)      donepezil (ARICEPT) 10 MG tablet Take 1 tablet by mouth Every Night for 90 days. 30 tablet 2    levothyroxine (SYNTHROID, LEVOTHROID) 50 MCG tablet TAKE 1 TABLET BY MOUTH EVERY MORNING 90 tablet 0    meclizine (ANTIVERT) 12.5 MG tablet Take 1 tablet by mouth 3 (Three) Times a Day As Needed for Dizziness. (Patient not taking: Reported on 7/2/2025) 30 tablet 0     No facility-administered medications prior to visit.     No opioid medication identified on active medication list. I have reviewed chart for other potential  high risk medication/s  "and harmful drug interactions in the elderly.      Aspirin is on active medication list. Aspirin use is indicated based on review of current medical condition/s. Pros and cons of this therapy have been discussed today. Benefits of this medication outweigh potential harm.  Patient has been encouraged to continue taking this medication.  .      Patient Active Problem List   Diagnosis    Palpitations    Coronary artery disease involving native coronary artery of native heart without angina pectoris    Hx of CABG    Bucket-handle tear of medial meniscus of left knee as current injury    Primary localized osteoarthrosis of shoulder region    Hyperlipidemia LDL goal <70    Paroxysmal SVT (supraventricular tachycardia)    Frequent PVCs     Advance Care Planning Advance Directive is not on file.  ACP discussion was held with the patient during this visit. Patient does not have an advance directive, information provided.            Objective   Vitals:    07/02/25 1052   BP: 144/67   BP Location: Left arm   Patient Position: Sitting   Cuff Size: Large Adult   Pulse: 67   SpO2: 97%   Weight: 88 kg (194 lb)   Height: 167.6 cm (66\")   PainSc: 0-No pain       Estimated body mass index is 31.31 kg/m² as calculated from the following:    Height as of this encounter: 167.6 cm (66\").    Weight as of this encounter: 88 kg (194 lb).    BMI is >= 30 and <35. (Class 1 Obesity). The following options were offered after discussion;: information on healthy weight added to patient's after visit summary            Does the patient have evidence of cognitive impairment? No                                                                                                Health  Risk Assessment    Smoking Status:  Social History     Tobacco Use   Smoking Status Former    Current packs/day: 0.00    Average packs/day: 2.0 packs/day for 13.0 years (26.0 ttl pk-yrs)    Types: Cigarettes    Start date: 1/1/1957    Quit date: 1970    Years since " quittin.5    Passive exposure: Past   Smokeless Tobacco Never   Tobacco Comments    Dont remember     Alcohol Consumption:  Social History     Substance and Sexual Activity   Alcohol Use Not Currently       Fall Risk Screen  STEADI Fall Risk Assessment was completed, and patient is at MODERATE risk for falls. Assessment completed on:2025    Depression Screening   Little interest or pleasure in doing things? Not at all   Feeling down, depressed, or hopeless? Not at all   PHQ-2 Total Score 0      Health Habits and Functional and Cognitive Screenin/2/2025    10:00 AM   Functional & Cognitive Status   Do you have difficulty preparing food and eating? No   Do you have difficulty bathing yourself, getting dressed or grooming yourself? No   Do you have difficulty using the toilet? No   Do you have difficulty moving around from place to place? No   Do you have trouble with steps or getting out of a bed or a chair? No   Current Diet Well Balanced Diet   Dental Exam Up to date   Eye Exam Up to date   Exercise (times per week) 7 times per week   Current Exercises Include Walking;Yard Work   Do you need help using the phone?  No   Are you deaf or do you have serious difficulty hearing?  No   Do you need help to go to places out of walking distance? No   Do you need help shopping? No   Do you need help preparing meals?  No   Do you need help with housework?  No   Do you need help with laundry? No   Do you need help taking your medications? No   Do you need help managing money? No   Do you ever drive or ride in a car without wearing a seat belt? No   Have you felt unusual fatigue (could be tiredness), stress, anger or loneliness in the last month? No   Who do you live with? Spouse   If you need help, do you have trouble finding someone available to you? No   Have you been bothered in the last four weeks by sexual problems? No   Do you have difficulty concentrating, remembering or making decisions? No            Age-appropriate Screening Schedule:  Refer to the list below for future screening recommendations based on patient's age, sex and/or medical conditions. Orders for these recommended tests are listed in the plan section. The patient has been provided with a written plan.    Health Maintenance List  Health Maintenance   Topic Date Due    RSV Vaccine - Adults (1 - 1-dose 75+ series) 07/10/2025 (Originally 7/22/2018)    ZOSTER VACCINE (1 of 2) 07/10/2025 (Originally 7/22/1993)    COVID-19 Vaccine (3 - 2024-25 season) 07/16/2025 (Originally 9/1/2024)    INFLUENZA VACCINE  07/16/2025 (Originally 7/1/2025)    Pneumococcal Vaccine 50+ (1 of 1 - PCV) 12/29/2025 (Originally 7/22/1993)    LIPID PANEL  02/21/2026    ANNUAL WELLNESS VISIT  07/02/2026    TDAP/TD VACCINES (2 - Td or Tdap) 04/10/2035                                                                                                                                                CMS Preventative Services Quick Reference  Risk Factors Identified During Encounter  Fall Risk-High or Moderate: Discussed Fall Prevention in the home    The above risks/problems have been discussed with the patient.  Pertinent information has been shared with the patient in the After Visit Summary.  An After Visit Summary and PPPS were made available to the patient.    Follow Up:   Next Medicare Wellness visit to be scheduled in 1 year.         Additional E&M Note during same encounter follows:  Patient has additional, significant, and separately identifiable condition(s)/problem(s) that require work above and beyond the Medicare Wellness Visit     Chief Complaint  Medicare Wellness-subsequent    Subjective   HPI  Michael is also being seen today for additional medical problem/s.    Patient is 79-year-old male presents today for follow-up. Overall he states he is feeling well. Has noticed an increased appetite.      Hypothyroidism-patient prescribed levothyroxine 50 mcg. He reports taking a  "medication in the AM but cannot tell me the name. No s/s hypothyroidism.  He is due updated TSH and T4 levels.     Patient is established  with cardiology for management of his paroxysmal SVT and CAD.  Patient was prescribed diltiazem 120 mg but was discontinued off his list at neuro appt in March as he stated he was not taking. Upon review of cardio note in January it was not discontinued by them. He believes he is taking his Lipitor, but does not believe he is taking his aspirin. States he has two pill bottles at home,one says to take in the AM the other says take at night.      He is seeing DARA Black with neurology for his mild cognitive impairment and other neuro symptoms after his fall. Was taking Aricept but order has , he is unsure of what he is taking.     Patient is due labs. Orders placed at today's visit. Discussed with patient that these are fasting labs.     No other concerns or complaints at this time.  Patient denies any diarrhea, constipation, blood in stool, urinary urgency, frequency, or dysuria, chest pain, shortness of breath or syncope.          Review of Systems   Constitutional:  Positive for appetite change.   Neurological:  Positive for confusion.   All other systems reviewed and are negative.             Objective   Vital Signs:  /67 (BP Location: Left arm, Patient Position: Sitting, Cuff Size: Large Adult)   Pulse 67   Ht 167.6 cm (66\")   Wt 88 kg (194 lb)   SpO2 97%   BMI 31.31 kg/m²   Physical Exam  Vitals reviewed.   Constitutional:       General: He is not in acute distress.     Appearance: He is not ill-appearing.   HENT:      Head: Normocephalic and atraumatic.   Eyes:      Conjunctiva/sclera: Conjunctivae normal.   Cardiovascular:      Rate and Rhythm: Normal rate and regular rhythm.      Heart sounds: Normal heart sounds.   Pulmonary:      Effort: Pulmonary effort is normal.      Breath sounds: Normal breath sounds.   Musculoskeletal:      " Cervical back: Normal range of motion.   Neurological:      Mental Status: He is alert and oriented to person, place, and time.   Psychiatric:         Mood and Affect: Mood normal.         Behavior: Behavior normal.         Thought Content: Thought content normal.         Judgment: Judgment normal.         The following data was reviewed by: DARA Krishna on 07/02/2025:  Data reviewed : Consultant notes cardiology,neurology  Common labs          7/9/2024    13:59 8/30/2024    12:38 2/21/2025    12:21   Common Labs   Glucose 84  97  83    BUN 18  21  19    Creatinine 0.88  1.05  1.18    Sodium 138  141  139    Potassium 4.3  4.0  4.7    Chloride 105  105  102    Calcium 9.1  9.5  10.1    Albumin 4.1   3.7    Total Bilirubin 0.2   0.3    Alkaline Phosphatase 48   39    AST (SGOT) 20   22    ALT (SGPT) 15   15    WBC 10.41   9.17    Hemoglobin 13.7   14.4    Hematocrit 41.2   41.9    Platelets 213   255    Total Cholesterol 217  217  215    Triglycerides 271  154  208    HDL Cholesterol 32  34  33    LDL Cholesterol  136  155  144           Assessment and Plan Additional age appropriate preventative wellness advice topics were discussed during today's preventative wellness exam(some topics already addressed during AWV portion of the note above):    Physical Activity: Advised cardiovascular activity 150 minutes per week as tolerated. (example brisk walk for 30 minutes, 5 days a week).     Nutrition: Discussed nutrition plan with patient. Information shared in after visit summary. Goal is for a well balanced diet to enhance overall health.     Healthy Weight: Discussed current and goal BMI with patient. Steps to attain this goal discussed. Information shared in after visit summary.     Injury Prevention Discussion:  Information shared in after visit summary.           Medicare annual wellness visit, subsequent  Care gasp addressed  Orders:    Comprehensive Metabolic Panel; Future    CBC & Differential; Future     Lipid Panel; Future    TSH+Free T4; Future    Hypothyroidism, unspecified type  Repeat labs. Call office with current meds he is taking  Orders:    TSH+Free T4; Future    Mixed hyperlipidemia   Lipid abnormalities are improving with treatment    Plan:  Follow up with cardiology. He will call office to advise what meds he has been taking. If not taking advised to restart, will defer to cardio. .      Discussed medication dosage, use, side effects, and goals of treatment in detail.    Counseled patient on lifestyle modifications to help control hyperlipidemia.     Patient Treatment Goals:   LDL goal is less than 70    Followup in 6 months.    Orders:    Comprehensive Metabolic Panel; Future    Lipid Panel; Future    Paroxysmal SVT (supraventricular tachycardia)  Continue to see cardio for management, unclear if he is taking diltiazem or not,will call back with med list  Orders:    Comprehensive Metabolic Panel; Future    CBC & Differential; Future    Lipid Panel; Future    Coronary artery disease involving native coronary artery of native heart without angina pectoris  Continue to see cardio for management, unsure if he is taking his aspirin or Lipitor, will call office to go over meds he is currently taking, will defer to cardio for treatment management    Orders:    Comprehensive Metabolic Panel; Future    CBC & Differential; Future    Lipid Panel; Future            Follow Up   Return in about 6 months (around 1/2/2026) for Next scheduled follow up.  Patient was given instructions and counseling regarding his condition or for health maintenance advice. Please see specific information pulled into the AVS if appropriate.

## 2025-07-02 NOTE — ASSESSMENT & PLAN NOTE
Continue to see cardio for management, unclear if he is taking diltiazem or not,will call back with med list  Orders:    Comprehensive Metabolic Panel; Future    CBC & Differential; Future    Lipid Panel; Future

## 2025-07-02 NOTE — TELEPHONE ENCOUNTER
Caller: Michael Garcia    Relationship to patient: Self    Best call back number: [phone]    Patient is needing: PATIENT WAS TOLD TO CALL AND INFORM DARA SMITH OF TWO MEDICATIONS HE IS TAKING. ONE IS     donepezil (ARICEPT) 10 MG tablet ()     THE OTHER MEDICATION IS     levothyroxine (SYNTHROID, LEVOTHROID) 50 MCG tablet     PATIENT STATED HE DOES NOT NEED REFILLS OF THESE MEDICATIONS AT THIS TIME.

## 2025-07-03 RX ORDER — DILTIAZEM HYDROCHLORIDE 120 MG/1
120 CAPSULE, EXTENDED RELEASE ORAL DAILY
Qty: 90 CAPSULE | Refills: 3 | Status: SHIPPED | OUTPATIENT
Start: 2025-07-03

## 2025-07-03 RX ORDER — ATORVASTATIN CALCIUM 40 MG/1
40 TABLET, FILM COATED ORAL DAILY
Qty: 90 TABLET | Refills: 3 | Status: SHIPPED | OUTPATIENT
Start: 2025-07-03

## 2025-07-03 RX ORDER — ASPIRIN 325 MG
325 TABLET ORAL DAILY
Qty: 90 TABLET | Refills: 3 | Status: SHIPPED | OUTPATIENT
Start: 2025-07-03

## 2025-07-03 NOTE — TELEPHONE ENCOUNTER
"Relay     \"He is also supposed to be taking Lipitor, aspirin and diltiazem from cardiology. Sent in refills, has appt with cardio next week. \"                "

## 2025-07-08 ENCOUNTER — OFFICE VISIT (OUTPATIENT)
Dept: CARDIOLOGY | Facility: CLINIC | Age: 80
End: 2025-07-08
Payer: MEDICARE

## 2025-07-08 VITALS
SYSTOLIC BLOOD PRESSURE: 147 MMHG | HEIGHT: 66 IN | BODY MASS INDEX: 30.53 KG/M2 | WEIGHT: 190 LBS | HEART RATE: 64 BPM | DIASTOLIC BLOOD PRESSURE: 86 MMHG

## 2025-07-08 DIAGNOSIS — I25.10 CORONARY ARTERY DISEASE INVOLVING NATIVE CORONARY ARTERY OF NATIVE HEART WITHOUT ANGINA PECTORIS: Primary | ICD-10-CM

## 2025-07-08 DIAGNOSIS — Z95.1 HX OF CABG: ICD-10-CM

## 2025-07-08 DIAGNOSIS — E78.2 HYPERLIPEMIA, MIXED: ICD-10-CM

## 2025-07-08 PROCEDURE — 99214 OFFICE O/P EST MOD 30 MIN: CPT | Performed by: SPECIALIST

## 2025-07-08 PROCEDURE — 93000 ELECTROCARDIOGRAM COMPLETE: CPT | Performed by: SPECIALIST

## 2025-07-08 NOTE — PROGRESS NOTES
[facility]  Cardiology progress Note    Patient Name: Michael Garcia  : 1945    CHIEF COMPLAINT  CAD        Subjective   Subjective     HISTORY OF PRESENT ILLNESS    Michael Garcia is a 79 y.o. male with CAD.  No chest pain.    REVIEW OF SYSTEMS    Constitutional:    No fever, no weight loss  Skin:     No rash  Otolaryngeal:    No difficulty swallowing  Cardiovascular: See HPI.  Pulmonary:    No cough, no sputum production    Personal History     Social History:    reports that he quit smoking about 55 years ago. His smoking use included cigarettes. He started smoking about 68 years ago. He has a 26 pack-year smoking history. He has been exposed to tobacco smoke. He has never used smokeless tobacco. He reports that he does not currently use alcohol. He reports that he does not use drugs.    Home Medications:  Current Outpatient Medications on File Prior to Visit   Medication Sig    aspirin 325 MG tablet Take 1 tablet by mouth Daily.    atorvastatin (LIPITOR) 40 MG tablet Take 1 tablet by mouth Daily.    dilTIAZem XR (Dilt-XR) 120 MG 24 hr capsule Take 1 capsule by mouth Daily.    levothyroxine (SYNTHROID, LEVOTHROID) 50 MCG tablet TAKE 1 TABLET BY MOUTH EVERY MORNING    donepezil (ARICEPT) 10 MG tablet Take 1 tablet by mouth Every Night for 90 days.     No current facility-administered medications on file prior to visit.       Past Medical History:   Diagnosis Date    Arrhythmia     Atrial fibrillation     Bucket-handle tear of medial meniscus of left knee as current injury 05/15/2018    Subsequent encounter    Coronary artery disease     Difficulty walking     From fall    Head injury 5 200     Fall    Hyperlipidemia     Memory loss     Osteoarthritis of AC (acromioclavicular) joint 2015    Palpitations     Peripheral tear of medial meniscus of left knee as current injury 2018    Subsequent encouter    Rotator cuff tear 2015    SLAP tear of shoulder 2015     Vision loss     From fall       Allergies:  No Known Allergies    Objective    Objective       Vitals:   Heart Rate:  [64] 64  BP: (147)/(86) 147/86  Body mass index is 30.67 kg/m².     PHYSICAL EXAM:    General Appearance:   well developed  well nourished  HENT:   oropharynx moist  lips not cyanotic  Neck:  thyroid not enlarged  supple  Respiratory:  no respiratory distress  normal breath sounds  no rales  Cardiovascular:  no jugular venous distention  regular rhythm  apical impulse normal  S1 normal, S2 normal  no S3, no S4   no murmur  no rub, no thrill  carotid pulses normal; no bruit  pedal pulses normal  lower extremity edema: none    Skin:   warm, dry  Psychiatric:  judgement and insight appropriate  normal mood and affect        Result Review:  I have personally reviewed the available results from  [x]  Laboratory  [x]  EKG  [x]  Cardiology  [x]  Medications  [x]  Old records  []  Other:       ECG 12 Lead    Date/Time: 7/8/2025 10:49 AM  Performed by: Casey Adams MD    Authorized by: Casey Adams MD  Comparison: compared with previous ECG   Rhythm: sinus rhythm  Ectopy: unifocal PVCs  Rate: normal  QRS axis: normal  Other findings: non-specific ST-T wave changes    Clinical impression: abnormal EKG  Comments: Normal sinus rhythm.  No significant changes compared to previous EKG        Lab Results   Component Value Date    CHOL 215 (H) 02/21/2025    CHOL 217 (H) 08/30/2024    CHOL 217 (H) 07/09/2024     Lab Results   Component Value Date    TRIG 208 (H) 02/21/2025    TRIG 154 (H) 08/30/2024    TRIG 271 (H) 07/09/2024     Lab Results   Component Value Date    HDL 33 (L) 02/21/2025    HDL 34 (L) 08/30/2024    HDL 32 (L) 07/09/2024     Lab Results   Component Value Date     (H) 02/21/2025     (H) 08/30/2024     (H) 07/09/2024     Lab Results   Component Value Date    VLDL 38 02/21/2025    VLDL 28 08/30/2024    VLDL 49 (H) 07/09/2024     Results for orders placed during the  hospital encounter of 09/11/24    Adult Transthoracic Echo Complete W/ Cont if Necessary Per Protocol 09/11/2024 11:42 AM    Interpretation Summary  Normal left ventricular systolic function with mild left ventricular hypertrophy.  Trace MR.  Mild TR with mild pulmonary hypertension.     Impression/Plan:   1.  Mixed hyperlipidemia: Continue Lipitor 40 mg once a day.  Monitor lipid and hepatic profile.  2.  CAD s/p CABG stable: Continue aspirin 81 mg once a day.  No chest pain.  3.  History of atrial fibrillation status post ablation stable: Continue diltiazem 120 mg once a day.  No palpitations.  4.  PSVT: Continue diltiazem 120 mg once a day              Casey Adams MD   07/08/25   10:49 EDT